# Patient Record
Sex: FEMALE | Race: WHITE | Employment: UNEMPLOYED | URBAN - METROPOLITAN AREA
[De-identification: names, ages, dates, MRNs, and addresses within clinical notes are randomized per-mention and may not be internally consistent; named-entity substitution may affect disease eponyms.]

---

## 2017-02-10 PROBLEM — R20.0 HAND NUMBNESS: Status: ACTIVE | Noted: 2017-02-10

## 2020-06-22 PROBLEM — R53.1 RAPIDLY PROGRESSIVE WEAKNESS: Status: ACTIVE | Noted: 2020-06-22

## 2020-06-22 PROBLEM — R00.0 TACHYCARDIA: Status: ACTIVE | Noted: 2020-06-22

## 2020-06-25 PROBLEM — G61.0 AIDP (ACUTE INFLAMMATORY DEMYELINATING POLYNEUROPATHY) (HCC): Status: ACTIVE | Noted: 2020-06-01

## 2020-07-09 PROBLEM — D64.9 ANEMIA: Status: ACTIVE | Noted: 2020-07-09

## 2020-07-09 PROBLEM — R79.89 ABNORMAL LFTS: Status: ACTIVE | Noted: 2020-07-09

## 2020-07-09 PROBLEM — A41.9 SEPSIS SECONDARY TO UTI (HCC): Status: ACTIVE | Noted: 2020-07-09

## 2020-07-31 PROBLEM — R53.1 RAPIDLY PROGRESSIVE WEAKNESS: Status: RESOLVED | Noted: 2020-06-22 | Resolved: 2020-07-31

## 2020-07-31 PROBLEM — A41.9 SEPSIS SECONDARY TO UTI (HCC): Status: RESOLVED | Noted: 2020-07-09 | Resolved: 2020-07-31

## 2020-07-31 PROBLEM — R00.0 TACHYCARDIA: Status: RESOLVED | Noted: 2020-06-22 | Resolved: 2020-07-31

## 2020-07-31 PROBLEM — D64.9 ANEMIA: Status: RESOLVED | Noted: 2020-07-09 | Resolved: 2020-07-31

## 2020-07-31 PROBLEM — R20.0 HAND NUMBNESS: Status: RESOLVED | Noted: 2017-02-10 | Resolved: 2020-07-31

## 2020-08-07 DIAGNOSIS — G61.0 AIDP (ACUTE INFLAMMATORY DEMYELINATING POLYNEUROPATHY) (HCC): ICD-10-CM

## 2020-08-07 LAB
A/G RATIO: 0.9 (ref 1.1–2.2)
ALBUMIN SERPL-MCNC: 4.1 G/DL (ref 3.4–5)
ALP BLD-CCNC: 90 U/L (ref 40–129)
ALT SERPL-CCNC: 18 U/L (ref 10–40)
ANION GAP SERPL CALCULATED.3IONS-SCNC: 14 MMOL/L (ref 3–16)
AST SERPL-CCNC: 26 U/L (ref 15–37)
BILIRUB SERPL-MCNC: 0.4 MG/DL (ref 0–1)
BUN BLDV-MCNC: 9 MG/DL (ref 7–20)
CALCIUM SERPL-MCNC: 9.3 MG/DL (ref 8.3–10.6)
CHLORIDE BLD-SCNC: 102 MMOL/L (ref 99–110)
CO2: 21 MMOL/L (ref 21–32)
CREAT SERPL-MCNC: 0.8 MG/DL (ref 0.6–1.1)
GFR AFRICAN AMERICAN: >60
GFR NON-AFRICAN AMERICAN: >60
GLOBULIN: 4.5 G/DL
GLUCOSE BLD-MCNC: 110 MG/DL (ref 70–99)
POTASSIUM SERPL-SCNC: 3.7 MMOL/L (ref 3.5–5.1)
SODIUM BLD-SCNC: 137 MMOL/L (ref 136–145)
TOTAL PROTEIN: 8.6 G/DL (ref 6.4–8.2)

## 2021-02-26 DIAGNOSIS — R10.11 RUQ PAIN: ICD-10-CM

## 2021-02-26 LAB
A/G RATIO: 0.9 (ref 1.1–2.2)
ALBUMIN SERPL-MCNC: 4.3 G/DL (ref 3.4–5)
ALP BLD-CCNC: 113 U/L (ref 40–129)
ALT SERPL-CCNC: 13 U/L (ref 10–40)
ANION GAP SERPL CALCULATED.3IONS-SCNC: 14 MMOL/L (ref 3–16)
AST SERPL-CCNC: 23 U/L (ref 15–37)
BASOPHILS ABSOLUTE: 0 K/UL (ref 0–0.2)
BASOPHILS RELATIVE PERCENT: 0 %
BILIRUB SERPL-MCNC: 0.3 MG/DL (ref 0–1)
BUN BLDV-MCNC: 9 MG/DL (ref 7–20)
CALCIUM SERPL-MCNC: 9.4 MG/DL (ref 8.3–10.6)
CHLORIDE BLD-SCNC: 100 MMOL/L (ref 99–110)
CO2: 22 MMOL/L (ref 21–32)
CREAT SERPL-MCNC: 0.6 MG/DL (ref 0.6–1.1)
EOSINOPHILS ABSOLUTE: 0 K/UL (ref 0–0.6)
EOSINOPHILS RELATIVE PERCENT: 0.2 %
GFR AFRICAN AMERICAN: >60
GFR NON-AFRICAN AMERICAN: >60
GLOBULIN: 5 G/DL
GLUCOSE BLD-MCNC: 80 MG/DL (ref 70–99)
HCT VFR BLD CALC: 38 % (ref 36–48)
HEMOGLOBIN: 12.9 G/DL (ref 12–16)
LIPASE: 44 U/L (ref 13–60)
LYMPHOCYTES ABSOLUTE: 1.6 K/UL (ref 1–5.1)
LYMPHOCYTES RELATIVE PERCENT: 27.5 %
MCH RBC QN AUTO: 28.4 PG (ref 26–34)
MCHC RBC AUTO-ENTMCNC: 34 G/DL (ref 31–36)
MCV RBC AUTO: 83.5 FL (ref 80–100)
MONOCYTES ABSOLUTE: 0.3 K/UL (ref 0–1.3)
MONOCYTES RELATIVE PERCENT: 4.4 %
NEUTROPHILS ABSOLUTE: 3.9 K/UL (ref 1.7–7.7)
NEUTROPHILS RELATIVE PERCENT: 67.9 %
PDW BLD-RTO: 13.6 % (ref 12.4–15.4)
PLATELET # BLD: 322 K/UL (ref 135–450)
PMV BLD AUTO: 7.5 FL (ref 5–10.5)
POTASSIUM SERPL-SCNC: 3.6 MMOL/L (ref 3.5–5.1)
RBC # BLD: 4.55 M/UL (ref 4–5.2)
SODIUM BLD-SCNC: 136 MMOL/L (ref 136–145)
TOTAL PROTEIN: 9.3 G/DL (ref 6.4–8.2)
WBC # BLD: 5.7 K/UL (ref 4–11)

## 2021-03-04 PROBLEM — G61.0 GUILLAIN-BARRE (HCC): Status: ACTIVE | Noted: 2021-03-04

## 2021-03-04 PROBLEM — K80.20 SYMPTOMATIC CHOLELITHIASIS: Status: ACTIVE | Noted: 2021-03-04

## 2021-05-20 DIAGNOSIS — R30.0 DYSURIA: ICD-10-CM

## 2021-05-20 LAB
BILIRUBIN URINE: NEGATIVE
BLOOD, URINE: NEGATIVE
CLARITY: CLEAR
COLOR: YELLOW
GLUCOSE URINE: NEGATIVE MG/DL
KETONES, URINE: NEGATIVE MG/DL
LEUKOCYTE ESTERASE, URINE: NEGATIVE
MICROSCOPIC EXAMINATION: NORMAL
NITRITE, URINE: NEGATIVE
PH UA: 6 (ref 5–8)
PROTEIN UA: NEGATIVE MG/DL
SPECIFIC GRAVITY UA: 1.02 (ref 1–1.03)
URINE REFLEX TO CULTURE: NORMAL
URINE TYPE: NORMAL
UROBILINOGEN, URINE: 0.2 E.U./DL

## 2023-03-28 ENCOUNTER — HOSPITAL ENCOUNTER (OUTPATIENT)
Dept: PHYSICAL THERAPY | Age: 38
Setting detail: THERAPIES SERIES
Discharge: HOME | End: 2023-03-28
Payer: OTHER MISCELLANEOUS

## 2023-03-28 PROCEDURE — 97110 THERAPEUTIC EXERCISES: CPT

## 2023-03-28 PROCEDURE — 97530 THERAPEUTIC ACTIVITIES: CPT

## 2023-03-28 PROCEDURE — 97161 PT EVAL LOW COMPLEX 20 MIN: CPT

## 2023-03-28 NOTE — PLAN OF CARE
extension    []Lateral Shift     []visible frontal plane deviation     []directional preference for lateral translation   [] \"Manipulation subgroup\"  (3/4 meets manipulation criteria)     []symptoms less than 16 days    []FABQ less than 19    []Hypomobility of lumbar spine    []IR of at least 1 hip >35 degrees    Other Factors to consider:    []no symptoms distal to the knee    []no red flags and minimal yellow flags    []no contraindications to manipulation   [] \"Traction subgroup\"      []signs and symptoms of nerve root compression (radiculopathy)     []unable to centralize distal symptoms with movement    []pain or numbness in the lumbar spine, buttock, and/or LE    []peripheralization with extension movements    []+ SLR or + crossed SLR (symptoms less than 70 degrees)  []Movement Control Approach   []\"Stabilization subgroup\"    []younger age (less than 40)     []greater general flexibility (post-partum, SLR >90)    []abberant movements, painful arc, or Charles's sign with flex/ext ROM    []positive prone instability test  []Functional Optimization Approach   []\"unspecified subgroup\"    []lower disability scores     []lower fear avoidance scores     []longer duration of symptoms (chronic)    []prior episodes of low back pain    []older age                         [x]Patient history, allergies, meds reviewed. Medical chart reviewed. See intake form. Review Of Systems (ROS):  [x]Performed Review of systems (Integumentary, CardioPulmonary, Neurological) by intake and observation. Intake form has been scanned into medical record. Patient has been instructed to contact their primary care physician regarding ROS issues if not already being addressed at this time.       Co-morbidities/Complexities (which will affect course of rehabilitation):   []None           Arthritic conditions   []Rheumatoid arthritis (M05.9)  []Osteoarthritis (M19.91)   Cardiovascular conditions   [x]Hypertension (I10)  []Hyperlipidemia

## 2023-03-28 NOTE — FLOWSHEET NOTE
[address] [place] [address] [facility], [place]                                                         Physical Therapy Daily Treatment Note  Date:  3/28/2023    Patient Name:  John Moreno    :  1985  MRN: [id number]    Medical/Treatment Diagnosis Information:  Diagnosis: M54.50 (ICD-10-CM) - Acute left-sided low back pain without sciatica  Treatment Diagnosis: M54.50  Low back pain, unspecified  Insurance/Certification information:  PT Insurance Information: Auto Insurance  Physician Information:  Referring Provider (secondary):  Rigoberto mSith MD  Has the plan of care been signed (Y/N):        []  Yes  [x]  No     Date of Patient follow up with Physician: PRN      Is this a Progress Report:     []  Yes  [x]  No        If Yes:  Date Range for reporting period:  Beginning- 3/28/2023   Ending    Progress report will be due (10 Rx or 30 days whichever is less):  3/96       Recertification will be due (POC Duration  / 90 days whichever is less): as above        Visit # Insurance Allowable Auth Required   1  []  Yes []  No        Functional Scale:     OUTCOME MEASURE DATE DEFICIT   JAYDEN 3/28 IE 6% Deficit         Latex Allergy:  [x]NO      []YES  Preferred Language for Healthcare:   [x]English       []other:      Pain level:  2/10  on 3/28/2023    SUBJECTIVE:  See eval    OBJECTIVE:   See eval  Standing Exam Normal Abnormal N/A Comments   Toe walk             Heel Walk           Side bending Range WFL:     - R: L sided low back pain  - L: lumbar spine pain          Pelvic Height           Fwd Bend- (aberrant juttering or innominate mvmt) 50% limitation, tight         Extension WFL, tight          Trendelenburg           Kemps/Quadrant           Stork           Rotation WFL                    Prone Exam Normal Abnormal N/A Comments   Prone knee bend x         Prone hip IR           B Achilles reflex/Pheasant           PA/Spring

## 2023-04-05 ENCOUNTER — HOSPITAL ENCOUNTER (OUTPATIENT)
Dept: PHYSICAL THERAPY | Age: 38
Setting detail: THERAPIES SERIES
Discharge: HOME | End: 2023-04-05
Payer: OTHER MISCELLANEOUS

## 2023-04-05 PROCEDURE — 97112 NEUROMUSCULAR REEDUCATION: CPT

## 2023-04-05 PROCEDURE — 97140 MANUAL THERAPY 1/> REGIONS: CPT

## 2023-04-05 PROCEDURE — 97110 THERAPEUTIC EXERCISES: CPT

## 2023-04-05 NOTE — FLOWSHEET NOTE
[address] [facility], [place]                                                         Physical Therapy Daily Treatment Note  Date:  2023    Patient Name:  Alysa Santos    :  1985  MRN: [id number]    Medical/Treatment Diagnosis Information:  Diagnosis: M54.50 (ICD-10-CM) - Acute left-sided low back pain without sciatica  Treatment Diagnosis: M54.50  Low back pain, unspecified  Insurance/Certification information:  PT Insurance Information: Auto Insurance  Physician Information:  Referring Provider (secondary): Giorgi Rubio MD  Has the plan of care been signed (Y/N):        [x]  Yes  []  No     Date of Patient follow up with Physician: PRN      Is this a Progress Report:     []  Yes  [x]  No        If Yes:  Date Range for reporting period:  Beginning- 3/28/2023   Ending    Progress report will be due (10 Rx or 30 days whichever is less):  3/29       Recertification will be due (POC Duration  / 90 days whichever is less): as above        Visit # Insurance Allowable Auth Required   2  []  Yes []  No        Functional Scale:     OUTCOME MEASURE DATE DEFICIT   JAYDEN 3/28 IE 6% Deficit         Latex Allergy:  [x]NO      []YES  Preferred Language for Healthcare:   [x]English       []other:      Pain level:  0/10  on 2023    SUBJECTIVE:   Patient states she was active cutting the grass and picking up sticks, which causes her increased pain.        OBJECTIVE:   See eval  Standing Exam Normal Abnormal N/A Comments   Toe walk             Heel Walk           Side bending Range WFL:     - R: L sided low back pain  - L: lumbar spine pain          Pelvic Height           Fwd Bend- (aberrant juttering or innominate mvmt) 50% limitation, tight         Extension WFL, tight          Trendelenburg           Kemps/Quadrant           Stork           Rotation [facility]                    Prone Exam Normal Abnormal N/A Comments

## 2023-04-18 ENCOUNTER — HOSPITAL ENCOUNTER (OUTPATIENT)
Dept: PHYSICAL THERAPY | Age: 38
Setting detail: THERAPIES SERIES
Discharge: HOME | End: 2023-04-18
Payer: OTHER MISCELLANEOUS

## 2023-04-18 PROCEDURE — 97530 THERAPEUTIC ACTIVITIES: CPT

## 2023-04-18 PROCEDURE — 97112 NEUROMUSCULAR REEDUCATION: CPT

## 2023-04-18 PROCEDURE — 97110 THERAPEUTIC EXERCISES: CPT

## 2023-04-18 NOTE — FLOWSHEET NOTE
proprioception and motor activation to allow for proper function  with self care and ADLs  [] (30277) Provided training and instruction to the patient for proper core and proximal hip recruitment and positioning with ambulation re-education     Home Exercise Program:    [x] (23608) Reviewed/Progressed HEP activities related to strengthening, flexibility, endurance, ROM of core, proximal hip and LE for functional self-care, mobility, lifting and ambulation   [] (09191) Reviewed/Progressed HEP activities related to improving balance, coordination, kinesthetic sense, posture, motor skill, proprioception of core, proximal hip and LE for self care, mobility, lifting, and ambulation      Manual Treatments:  PROM / STM / Oscillations-Mobs:  G-I, II, III, IV (PA's, Inf., Post.)  [x] (37762) Provided manual therapy to mobilize proximal hip and LS spine soft tissue/joints for the purpose of modulating pain, promoting relaxation,  increasing ROM, reducing/eliminating soft tissue swelling/inflammation/restriction, improving soft tissue extensibility and allowing for proper ROM for normal function with self care, mobility, lifting and ambulation. Modalities:       Charges:  Timed Code Treatment Minutes: 45'   Total Treatment Minutes: 36'       [] EVAL (LOW) 455 1011   [] EVAL (MOD) 99574   [] EVAL (HIGH) 90718   [] RE-EVAL   [x] LT(87503) x  1   [] IONTO  [x] NMR (67017) x  1   [] VASO  [] Manual (41227) x      [] Other:  [x] TA x   1   [] Mech Traction (63710)  [] ES(attended) (04804)      [] ES (un) (51937):     HEP instruction:   Access Code: [email or web address]  URL: [email or web address]  Date: 04/12/2023  Prepared by: Mely Henderson  (See scanned forms)    GOALS:  Patient stated goal: return to [facility]    Therapist goals for Patient:     Short Term Goals: To be achieved in: 2 weeks  1. Independent in HEP and progression per patient tolerance, in order to prevent re-injury. [] Progressing: [] Met: [] Not Met: [] Adjusted   2.

## 2023-04-20 ENCOUNTER — HOSPITAL ENCOUNTER (OUTPATIENT)
Dept: PHYSICAL THERAPY | Age: 38
Setting detail: THERAPIES SERIES
Discharge: HOME | End: 2023-04-20
Payer: OTHER MISCELLANEOUS

## 2023-04-20 PROCEDURE — 97530 THERAPEUTIC ACTIVITIES: CPT

## 2023-04-20 NOTE — FLOWSHEET NOTE
and ambulation      Manual Treatments:  PROM / STM / Oscillations-Mobs:  G-I, II, III, IV (PA's, Inf., Post.)  [x] (56756) Provided manual therapy to mobilize proximal hip and LS spine soft tissue/joints for the purpose of modulating pain, promoting relaxation,  increasing ROM, reducing/eliminating soft tissue swelling/inflammation/restriction, improving soft tissue extensibility and allowing for proper ROM for normal function with self care, mobility, lifting and ambulation. Modalities:       Charges:  Timed Code Treatment Minutes: 15'   Total Treatment Minutes: 17'        [] EVAL (LOW) 17593   [] EVAL (MOD) 99947   [] EVAL (HIGH) 68057   [] RE-EVAL   [] ER(71276) x     [] IONTO  [] NMR (57356) x     [] VASO  [] Manual (67338) x      [] Other:  [x] TA x   1   [] Mech Traction (07553)  [] ES(attended) (25379)      [] ES (un) (97459):     HEP instruction:   Access Code: [email or web address]  URL: [email or web address]  Date: 04/20/2023  Prepared by: Ioana Diaz  (See scanned forms)    GOALS:  Patient stated goal: return to [facility]    Therapist goals for Patient:     Short Term Goals: To be achieved in: 2 weeks  1. Independent in HEP and progression per patient tolerance, in order to prevent re-injury. [] Progressing: [x] Met: [] Not Met: [] Adjusted   2. Patient will have a decrease in pain to facilitate improvement in movement, function, and ADLs as indicated by Functional Deficits. [] Progressing: [x] Met: [] Not Met: [] Adjusted    Long Term Goals: To be achieved in: 6 weeks  1. Disability index score of 3% or less for the JAYDEN to assist with reaching prior level of function. [] Progressing: [x] Met: [] Not Met: [] Adjusted  2. Patient will demonstrate increased lumbar AROM to [facility] and pain free to allow for proper joint functioning as indicated by patients Functional Deficits. [] Progressing: [x] Met: [] Not Met: [] Adjusted  3.  Patient will be able to bend forwards to tie her shoes without increased

## 2023-06-08 ENCOUNTER — HOSPITAL ENCOUNTER (EMERGENCY)
Age: 38
Discharge: HOME | End: 2023-06-08
Payer: OTHER MISCELLANEOUS

## 2023-06-08 ENCOUNTER — APPOINTMENT (OUTPATIENT)
Dept: CT IMAGING | Age: 38
End: 2023-06-08
Payer: OTHER MISCELLANEOUS

## 2023-06-08 VITALS
BODY MASS INDEX: 31.12 KG/M2 | RESPIRATION RATE: 20 BRPM | WEIGHT: 154.1 LBS | SYSTOLIC BLOOD PRESSURE: 134 MMHG | DIASTOLIC BLOOD PRESSURE: 88 MMHG | OXYGEN SATURATION: 100 % | TEMPERATURE: 98 F | HEART RATE: 97 BPM

## 2023-06-08 DIAGNOSIS — R31.9 URINARY TRACT INFECTION WITH HEMATURIA, SITE UNSPECIFIED: Primary | ICD-10-CM

## 2023-06-08 DIAGNOSIS — N39.0 URINARY TRACT INFECTION WITH HEMATURIA, SITE UNSPECIFIED: Primary | ICD-10-CM

## 2023-06-08 DIAGNOSIS — R10.9 ABDOMINAL PAIN, UNSPECIFIED ABDOMINAL LOCATION: ICD-10-CM

## 2023-06-08 LAB
ALBUMIN SERPL-MCNC: 4.2 G/DL (ref 3.4–5)
ALBUMIN/GLOB SERPL: 1.1 {RATIO} (ref 1.1–2.2)
ALP SERPL-CCNC: 104 U/L (ref 40–129)
ALT SERPL-CCNC: 20 U/L (ref 10–40)
ANION GAP SERPL CALCULATED.3IONS-SCNC: 11 MMOL/L (ref 3–16)
AST SERPL-CCNC: 30 U/L (ref 15–37)
BACTERIA URNS QL MICRO: ABNORMAL /HPF
BASOPHILS # BLD: 0 K/UL (ref 0–0.2)
BASOPHILS NFR BLD: 0.1 %
BILIRUB SERPL-MCNC: <0.2 MG/DL (ref 0–1)
BILIRUB UR QL STRIP.AUTO: NEGATIVE
BUN SERPL-MCNC: 8 MG/DL (ref 7–20)
CALCIUM SERPL-MCNC: 9.2 MG/DL (ref 8.3–10.6)
CHLORIDE SERPL-SCNC: 104 MMOL/L (ref 99–110)
CLARITY UR: ABNORMAL
CO2 SERPL-SCNC: 24 MMOL/L (ref 21–32)
COLOR UR: YELLOW
CREAT SERPL-MCNC: 0.9 MG/DL (ref 0.6–1.1)
DEPRECATED RDW RBC AUTO: 14 % (ref 12.4–15.4)
EOSINOPHIL # BLD: 0 K/UL (ref 0–0.6)
EOSINOPHIL NFR BLD: 0.3 %
EPI CELLS #/AREA URNS AUTO: 39 /HPF (ref 0–5)
GFR SERPLBLD CREATININE-BSD FMLA CKD-EPI: >60 ML/MIN/{1.73_M2}
GLUCOSE SERPL-MCNC: 101 MG/DL (ref 70–99)
GLUCOSE UR STRIP.AUTO-MCNC: NEGATIVE MG/DL
HCG UR QL: NEGATIVE
HCT VFR BLD AUTO: 35.1 % (ref 36–48)
HGB BLD-MCNC: 12.1 G/DL (ref 12–16)
HGB UR QL STRIP.AUTO: ABNORMAL
HYALINE CASTS #/AREA URNS AUTO: 2 /LPF (ref 0–8)
KETONES UR STRIP.AUTO-MCNC: NEGATIVE MG/DL
LEUKOCYTE ESTERASE UR QL STRIP.AUTO: ABNORMAL
LIPASE SERPL-CCNC: 27 U/L (ref 13–60)
LYMPHOCYTES # BLD: 1.5 K/UL (ref 1–5.1)
LYMPHOCYTES NFR BLD: 22.1 %
MCH RBC QN AUTO: 29.6 PG (ref 26–34)
MCHC RBC AUTO-ENTMCNC: 34.5 G/DL (ref 31–36)
MCV RBC AUTO: 85.7 FL (ref 80–100)
MONOCYTES # BLD: 0.5 K/UL (ref 0–1.3)
MONOCYTES NFR BLD: 7.7 %
NEUTROPHILS # BLD: 4.7 K/UL (ref 1.7–7.7)
NEUTROPHILS NFR BLD: 69.8 %
NITRITE UR QL STRIP.AUTO: NEGATIVE
PH UR STRIP.AUTO: 5 [PH] (ref 5–8)
PLATELET # BLD AUTO: 295 K/UL (ref 135–450)
PMV BLD AUTO: 7.1 FL (ref 5–10.5)
POTASSIUM SERPL-SCNC: 4.1 MMOL/L (ref 3.5–5.1)
PROT SERPL-MCNC: 8.1 G/DL (ref 6.4–8.2)
PROT UR STRIP.AUTO-MCNC: ABNORMAL MG/DL
RBC # BLD AUTO: 4.09 M/UL (ref 4–5.2)
SODIUM SERPL-SCNC: 139 MMOL/L (ref 136–145)
SP GR UR STRIP.AUTO: 1.02 (ref 1–1.03)
UA COMPLETE W REFLEX CULTURE PNL UR: YES
UA DIPSTICK W REFLEX MICRO PNL UR: YES
URN SPEC COLLECT METH UR: ABNORMAL
UROBILINOGEN UR STRIP-ACNC: 0.2 E.U./DL
WBC # BLD AUTO: 6.7 K/UL (ref 4–11)
WBC #/AREA URNS AUTO: 37 /HPF (ref 0–5)

## 2023-06-08 PROCEDURE — 87086 URINE CULTURE/COLONY COUNT: CPT

## 2023-06-08 PROCEDURE — 80053 COMPREHEN METABOLIC PANEL: CPT

## 2023-06-08 PROCEDURE — 6370000000 HC RX 637 (ALT 250 FOR IP): Performed by: PHYSICIAN ASSISTANT

## 2023-06-08 PROCEDURE — 83690 ASSAY OF LIPASE: CPT

## 2023-06-08 PROCEDURE — 74177 CT ABD & PELVIS W/CONTRAST: CPT

## 2023-06-08 PROCEDURE — 84703 CHORIONIC GONADOTROPIN ASSAY: CPT

## 2023-06-08 PROCEDURE — 85025 COMPLETE CBC W/AUTO DIFF WBC: CPT

## 2023-06-08 PROCEDURE — 36415 COLL VENOUS BLD VENIPUNCTURE: CPT

## 2023-06-08 PROCEDURE — 6360000004 HC RX CONTRAST MEDICATION: Performed by: PHYSICIAN ASSISTANT

## 2023-06-08 PROCEDURE — 81001 URINALYSIS AUTO W/SCOPE: CPT

## 2023-06-08 RX ORDER — DICYCLOMINE HYDROCHLORIDE 10 MG/1
20 CAPSULE ORAL ONCE
Status: COMPLETED | OUTPATIENT
Start: 2023-06-08 | End: 2023-06-08

## 2023-06-08 RX ORDER — CEFUROXIME AXETIL 250 MG/1
250 TABLET ORAL 2 TIMES DAILY
Qty: 10 TABLET | Refills: 0 | Status: SHIPPED | OUTPATIENT
Start: 2023-06-08 | End: 2023-06-13

## 2023-06-08 RX ADMIN — DICYCLOMINE HYDROCHLORIDE 20 MG: 10 CAPSULE ORAL at 20:10

## 2023-06-08 RX ADMIN — IOPAMIDOL 75 ML: 755 INJECTION, SOLUTION INTRAVENOUS at 20:32

## 2023-06-08 ASSESSMENT — PAIN - FUNCTIONAL ASSESSMENT
PAIN_FUNCTIONAL_ASSESSMENT: 0-10
PAIN_FUNCTIONAL_ASSESSMENT: NONE - DENIES PAIN

## 2023-06-08 ASSESSMENT — PAIN SCALES - GENERAL
PAINLEVEL_OUTOF10: 2
PAINLEVEL_OUTOF10: 3

## 2023-06-08 ASSESSMENT — ENCOUNTER SYMPTOMS
DIARRHEA: 1
COUGH: 0
EYE PAIN: 0
BACK PAIN: 0
SORE THROAT: 0
VOMITING: 0
NAUSEA: 0
ABDOMINAL PAIN: 1
SHORTNESS OF BREATH: 0

## 2023-06-08 ASSESSMENT — PAIN DESCRIPTION - LOCATION: LOCATION: ABDOMEN

## 2023-06-09 NOTE — DISCHARGE INSTRUCTIONS
Take prescribed medication as prescribed  Follow-up with primary care provider in 1 week to make sure urine clears up  Take OTC Tylenol Motrin as needed for pain  Return emergency room for any worsening

## 2023-06-09 NOTE — ED NOTES
Discharge and education instructions reviewed. Patient verbalized understanding, teach-back successful. Patient denied questions at this time. No acute distress noted. Patient instructed to follow-up as noted - return to emergency department if symptoms worsen. Patient verbalized understanding. Discharged per EDMD with discharge instructions.         Latoya Joaquin RN  06/08/23 4215

## 2023-06-09 NOTE — ED PROVIDER NOTES
**ADVANCED PRACTICE PROVIDER, I HAVE EVALUATED THIS PATIENT**        [address]      Pt Name: Augustine Welsh  JSQ:[id number]  [place] 1985  Date of evaluation: 6/8/2023  Provider: Anne Luo PA-C  Note Started: 8:24 PM EDT 6/8/2023        Chief Complaint:    Chief Complaint   Patient presents with    Abdominal Pain     Since Tuesday, thought maybe was related to [place]. Seen by PCP today and told she may have an infection. Nursing Notes, Past Medical Hx, Past Surgical Hx, Social Hx, Allergies, and Family Hx were all reviewed and agreed with or any disagreements were addressed in the HPI.    HPI: (Location, Duration, Timing, Severity, Quality, Assoc Sx, Context, Modifying factors)    History From: Patient  Limitations to history : None    Social Determinants Significantly Affecting Health : None    Chief Complaint of mid abdominal pain. Patient states he is active doing Sunday and states on skyline there and she started having some abdominal pain with diarrhea or Tuesday night Wednesday morning. Her last bowel movement was yesterday with diarrhea. She is complained of some mid abdominal pain today. Denies fever, pain does radiate to her back. Denies chest pain, no extremity pain or weakness. Denies blood in her stool. No weakness. Denies fever. No other complaints.     This is a  43 y.o. female who presents to emergency room with above complaint    PastMedical/Surgical History:      Diagnosis Date    AIDP (acute inflammatory demyelinating polyneuropathy) ([facility] [address] 06/2020    Admit to [facility]    Anesthesia complication     after colonoscopy-was lightheaded    Anxiety     COVID     Depression     Guillain Barré syndrome (HCC)     Heartburn     HTN (hypertension)          Procedure Laterality Date    CHOLECYSTECTOMY, LAPAROSCOPIC N/A 03/15/2021    [facility]    COLONOSCOPY  12/01/2017    [facility]- Normal    [facility]IA

## 2023-06-10 LAB — BACTERIA UR CULT: NORMAL

## 2023-07-13 ENCOUNTER — HOSPITAL ENCOUNTER (OUTPATIENT)
Dept: WOMENS IMAGING | Age: 38
Discharge: HOME | End: 2023-07-13
Payer: MEDICARE

## 2023-07-13 DIAGNOSIS — Z12.31 BREAST CANCER SCREENING BY MAMMOGRAM: ICD-10-CM

## 2023-07-13 PROCEDURE — 77067 SCR MAMMO BI INCL CAD: CPT

## 2023-07-31 ENCOUNTER — HOSPITAL ENCOUNTER (OUTPATIENT)
Dept: WOMENS IMAGING | Age: 38
Discharge: HOME | End: 2023-07-31
Payer: MEDICARE

## 2023-07-31 DIAGNOSIS — R92.8 ABNORMAL MAMMOGRAM: ICD-10-CM

## 2023-07-31 PROCEDURE — G0279 TOMOSYNTHESIS, MAMMO: HCPCS

## 2023-10-21 ENCOUNTER — OFFICE VISIT (OUTPATIENT)
Age: 38
End: 2023-10-21

## 2023-10-21 VITALS
BODY MASS INDEX: 30.42 KG/M2 | OXYGEN SATURATION: 98 % | DIASTOLIC BLOOD PRESSURE: 84 MMHG | SYSTOLIC BLOOD PRESSURE: 116 MMHG | HEART RATE: 87 BPM | TEMPERATURE: 98.9 F | RESPIRATION RATE: 18 BRPM | WEIGHT: 150.6 LBS

## 2023-10-21 DIAGNOSIS — J06.9 VIRAL URI: Primary | ICD-10-CM

## 2023-10-21 PROBLEM — R53.1 WEAKNESS: Status: ACTIVE | Noted: 2022-11-29

## 2023-10-21 PROBLEM — M22.41 CHONDROMALACIA OF PATELLA, RIGHT: Status: ACTIVE | Noted: 2019-12-10

## 2023-10-21 PROBLEM — E53.8 LOW SERUM VITAMIN B12: Status: ACTIVE | Noted: 2022-11-30

## 2023-10-21 LAB
Lab: NORMAL
QC PASS/FAIL: NORMAL
SARS-COV-2 RDRP RESP QL NAA+PROBE: NEGATIVE

## 2023-10-21 ASSESSMENT — ENCOUNTER SYMPTOMS
CHEST TIGHTNESS: 0
SHORTNESS OF BREATH: 0
VOMITING: 0
VOICE CHANGE: 0
DIARRHEA: 0
SORE THROAT: 1
NAUSEA: 0
ABDOMINAL PAIN: 0
CONSTIPATION: 0
TROUBLE SWALLOWING: 0
COUGH: 1
SINUS PRESSURE: 1

## 2023-10-21 NOTE — PATIENT INSTRUCTIONS
Please follow up with your family doctor as needed  Please go to develop shortness of breath, chest pain, or fevers you cannot control

## 2024-02-22 ENCOUNTER — APPOINTMENT (OUTPATIENT)
Dept: ULTRASOUND IMAGING | Age: 39
End: 2024-02-22
Attending: INTERNAL MEDICINE
Payer: MEDICARE

## 2024-02-22 ENCOUNTER — HOSPITAL ENCOUNTER (OUTPATIENT)
Dept: WOMENS IMAGING | Age: 39
Discharge: HOME | End: 2024-02-22
Attending: INTERNAL MEDICINE
Payer: MEDICARE

## 2024-02-22 DIAGNOSIS — R92.8 ABNORMAL MAMMOGRAM OF RIGHT BREAST: ICD-10-CM

## 2024-02-22 PROCEDURE — G0279 TOMOSYNTHESIS, MAMMO: HCPCS

## 2024-07-01 ENCOUNTER — HOSPITAL ENCOUNTER (EMERGENCY)
Age: 39
Discharge: HOME | End: 2024-07-01
Attending: EMERGENCY MEDICINE
Payer: MEDICARE

## 2024-07-01 VITALS
DIASTOLIC BLOOD PRESSURE: 65 MMHG | TEMPERATURE: 98.2 F | SYSTOLIC BLOOD PRESSURE: 125 MMHG | HEART RATE: 81 BPM | RESPIRATION RATE: 24 BRPM | OXYGEN SATURATION: 100 % | BODY MASS INDEX: 34.22 KG/M2 | HEIGHT: 59 IN | WEIGHT: 169.75 LBS

## 2024-07-01 DIAGNOSIS — R11.2 NAUSEA AND VOMITING, UNSPECIFIED VOMITING TYPE: ICD-10-CM

## 2024-07-01 DIAGNOSIS — T50.905A ADVERSE EFFECT OF DRUG, INITIAL ENCOUNTER: Primary | ICD-10-CM

## 2024-07-01 LAB
GLUCOSE BLD-MCNC: 92 MG/DL (ref 70–99)
PERFORMED ON: NORMAL

## 2024-07-01 PROCEDURE — 99283 EMERGENCY DEPT VISIT LOW MDM: CPT

## 2024-07-01 PROCEDURE — 93005 ELECTROCARDIOGRAM TRACING: CPT | Performed by: EMERGENCY MEDICINE

## 2024-07-01 RX ORDER — ONDANSETRON 4 MG/1
4 TABLET, ORALLY DISINTEGRATING ORAL EVERY 8 HOURS PRN
Qty: 7 TABLET | Refills: 0 | Status: SHIPPED | OUTPATIENT
Start: 2024-07-01

## 2024-07-01 ASSESSMENT — LIFESTYLE VARIABLES
HOW MANY STANDARD DRINKS CONTAINING ALCOHOL DO YOU HAVE ON A TYPICAL DAY: 1 OR 2
HOW OFTEN DO YOU HAVE A DRINK CONTAINING ALCOHOL: MONTHLY OR LESS

## 2024-07-01 ASSESSMENT — PAIN - FUNCTIONAL ASSESSMENT: PAIN_FUNCTIONAL_ASSESSMENT: NONE - DENIES PAIN

## 2024-07-02 LAB
EKG ATRIAL RATE: 89 BPM
EKG DIAGNOSIS: NORMAL
EKG P AXIS: 52 DEGREES
EKG P-R INTERVAL: 138 MS
EKG Q-T INTERVAL: 384 MS
EKG QRS DURATION: 80 MS
EKG QTC CALCULATION (BAZETT): 467 MS
EKG R AXIS: 30 DEGREES
EKG T AXIS: 33 DEGREES
EKG VENTRICULAR RATE: 89 BPM

## 2024-07-02 PROCEDURE — 93010 ELECTROCARDIOGRAM REPORT: CPT | Performed by: INTERNAL MEDICINE

## 2024-07-02 ASSESSMENT — ENCOUNTER SYMPTOMS
SHORTNESS OF BREATH: 0
NAUSEA: 1
VOMITING: 1
BACK PAIN: 0
ABDOMINAL PAIN: 0

## 2024-07-02 NOTE — ED PROVIDER NOTES
[facility] EMERGENCY DEPARTMENT    Name: Nkechi Shukla : 1985 MRN: [id number] Date of Service: 2024    Initial VS: BP: (!) 146/97, Temp: 98.2 °F (36.8 °C), Pulse: 100, Respirations: 20, SpO2: 100 %     CC: fatigue, vomiting    HPI: this patient is a 39 y.o. female presenting to the ED from home. Ms. Shukla tells me that she has never used any marijuana edibles before. Late this afternoon she wanted to try one so she ingested the entirety of a marijuana gummy provided to her by a family member. About an hour later she started to feel incredibly tired and \"a little weird.\" Soon thereafter she became nauseous and started vomiting profusely. She spoke with this family member, who then informed Ms. Shukla that this gummy contained a high dose of THC and she was supposed to only consume 1/8th to 1/4th of it at a time. Ms. Shukla was concerned by these symptoms so she called 911 for assistance and she was brought here to be evaluated. On arrival here, however, she states that she is feeling much less fatigued, she is \"not feeling weird anymore,\" and her nausea seems to have subsided over time. She notes that she is actually feeling \"pretty good now.\" She has not appreciated other changes from her usual state of health and she denies other current complaints.  _____________________________________________________________________    Review of Systems   Constitutional:  Positive for fatigue. Negative for chills and fever.   Eyes:  Negative for visual disturbance.   Respiratory:  Negative for shortness of breath.    Cardiovascular:  Negative for chest pain.   Gastrointestinal:  Positive for nausea and vomiting. Negative for abdominal pain.   Genitourinary:  Negative for decreased urine volume and flank pain.   Musculoskeletal:  Negative for back pain and gait problem.   Skin:  Negative for rash.   Neurological:  Negative for syncope, weakness, numbness and headaches.       Physical

## 2024-07-02 NOTE — DISCHARGE INSTRUCTIONS
Your prescription has been sent to [facility].    Be sure to contact your primary care provider to arrange for a follow up visit.    If you have any new or worsening issues after going home don't hesitate to return here for reevaluation at any time 24/7!

## 2024-07-24 ENCOUNTER — OFFICE VISIT (OUTPATIENT)
Dept: ORTHOPEDIC SURGERY | Age: 39
End: 2024-07-24

## 2024-07-24 ENCOUNTER — TELEPHONE (OUTPATIENT)
Dept: ORTHOPEDIC SURGERY | Age: 39
End: 2024-07-24

## 2024-07-24 VITALS — BODY MASS INDEX: 34.07 KG/M2 | HEIGHT: 59 IN | WEIGHT: 169 LBS

## 2024-07-24 DIAGNOSIS — M25.562 ACUTE PAIN OF LEFT KNEE: Primary | ICD-10-CM

## 2024-07-24 DIAGNOSIS — M70.52 PES ANSERINUS BURSITIS OF LEFT KNEE: ICD-10-CM

## 2024-07-24 DIAGNOSIS — M17.12 ARTHRITIS OF LEFT KNEE: ICD-10-CM

## 2024-07-24 RX ORDER — METHYLPREDNISOLONE ACETATE 40 MG/ML
80 INJECTION, SUSPENSION INTRA-ARTICULAR; INTRALESIONAL; INTRAMUSCULAR; SOFT TISSUE ONCE
Status: COMPLETED | OUTPATIENT
Start: 2024-07-24 | End: 2024-07-24

## 2024-07-24 RX ADMIN — METHYLPREDNISOLONE ACETATE 80 MG: 40 INJECTION, SUSPENSION INTRA-ARTICULAR; INTRALESIONAL; INTRAMUSCULAR; SOFT TISSUE at 13:33

## 2024-07-24 RX ADMIN — Medication 4 ML: at 13:32

## 2024-07-24 NOTE — PROGRESS NOTES
Nkechi Shukla is seen today at the request of Dr. Jose Noonan for evaluation and treatment of chronic left knee pain.  He saw her for about 2 months of left knee pain last week.  She was given a Medrol Dosepak.  She says that has not been very helpful  Today she reports severe pain in the medial knee that is 2 out of 10 at rest but 9 out of 10 at night.  Ibuprofen is somewhat helpful.            Past medical history significant for anxiety and high blood pressure.  She also has chronic inflammatory demyelinating polyneuropathy and is treated with infusions with neurology.    She is disabled from her neuropathy.    History: Patient's relevant past family, medical, and social history are reviewed as part of today's visit. ROS of pertinent positives and negatives as above; otherwise negative.    General Exam:    Vitals: Height 1.499 m (4' 11\"), weight 76.7 kg (169 lb), last menstrual period 06/03/2024, not currently breastfeeding.  Constitutional: Patient is adequately groomed with no evidence of malnutrition.  She appears markedly older than her stated age.  Mental Status: The patient is oriented to time, place and person.  The patient's mood and affect are appropriate.  Gait:  Patient walks with normal gait and station.  Lymphatic: The lymphatic examination bilaterally reveals all areas to be without enlargement or induration.  Vascular: Examination reveals no swelling or calf tenderness.  Peripheral pulses are palpable and 2+.  Neurological: The patient has good coordination.  There is no weakness or sensory deficit.    Skin:    Head/Neck: inspection reveals no rashes, ulcerations or lesions.  Trunk:  inspection reveals no rashes, ulcerations or lesions.  Right Lower Extremity: inspection reveals no rashes, ulcerations or lesions.  Left Lower Extremity: inspection reveals no rashes, ulcerations or lesions.    Examination of the bilateral hips reveals normal flexion and extension.  There is no restriction in

## 2024-08-10 NOTE — PROGRESS NOTES
Nkechi Shukla returns today to follow-up her left knee.  We did a cortisone injection for arthritis about 3 weeks ago.  She returns today to determine if we need to consider a pes bursa injection.      She said the intra-articular injection was tremendously helpful.  She still has a little bit of pain toward the proximal tibia.    General Exam:    Vitals: Height 1.499 m (4' 11\"), weight 76.7 kg (169 lb), not currently breastfeeding.  Constitutional: Patient is adequately groomed with no evidence of malnutrition  Mental Status: The patient is oriented to time, place and person.  The patient's mood and affect are appropriate.    She walks with a normal gait today.  Left knee has no effusion.  She has no joint line pain medially or laterally but still moderate discomfort over the pes bursa.    Assessment: Improving symptoms after intra-articular injection for left knee arthritis but ongoing discomfort related to her pes bursa.    Plan: We discussed the risk, benefits, and alternatives to the intervention and today were going to proceed with a pes bursa injection.    Procedure: After obtaining verbal consent under sterile technique left knee was injected the point of maximum tenderness at the pes bursa with 80 mg of Depo-Medrol and 10 mg of lidocaine.  She tolerated that well.    She understands we can repeat injections at intervals not sooner than every 3 months.  Follow-up with me on an as-needed basis.

## 2024-08-12 ENCOUNTER — TELEPHONE (OUTPATIENT)
Dept: ORTHOPEDIC SURGERY | Age: 39
End: 2024-08-12

## 2024-08-12 ENCOUNTER — OFFICE VISIT (OUTPATIENT)
Dept: ORTHOPEDIC SURGERY | Age: 39
End: 2024-08-12
Payer: MEDICARE

## 2024-08-12 VITALS — WEIGHT: 169 LBS | HEIGHT: 59 IN | BODY MASS INDEX: 34.07 KG/M2

## 2024-08-12 DIAGNOSIS — M25.562 ACUTE PAIN OF LEFT KNEE: ICD-10-CM

## 2024-08-12 DIAGNOSIS — M70.52 PES ANSERINUS BURSITIS OF LEFT KNEE: Primary | ICD-10-CM

## 2024-08-12 DIAGNOSIS — M17.12 ARTHRITIS OF LEFT KNEE: ICD-10-CM

## 2024-08-12 PROCEDURE — G8427 DOCREV CUR MEDS BY ELIG CLIN: HCPCS | Performed by: ORTHOPAEDIC SURGERY

## 2024-08-12 PROCEDURE — 20610 DRAIN/INJ JOINT/BURSA W/O US: CPT | Performed by: ORTHOPAEDIC SURGERY

## 2024-08-12 PROCEDURE — 1036F TOBACCO NON-USER: CPT | Performed by: ORTHOPAEDIC SURGERY

## 2024-08-12 PROCEDURE — 99212 OFFICE O/P EST SF 10 MIN: CPT | Performed by: ORTHOPAEDIC SURGERY

## 2024-08-12 PROCEDURE — G8417 CALC BMI ABV UP PARAM F/U: HCPCS | Performed by: ORTHOPAEDIC SURGERY

## 2024-08-12 RX ORDER — METHYLPREDNISOLONE ACETATE 40 MG/ML
80 INJECTION, SUSPENSION INTRA-ARTICULAR; INTRALESIONAL; INTRAMUSCULAR; SOFT TISSUE ONCE
Status: COMPLETED | OUTPATIENT
Start: 2024-08-12 | End: 2024-08-12

## 2024-08-12 RX ADMIN — METHYLPREDNISOLONE ACETATE 80 MG: 40 INJECTION, SUSPENSION INTRA-ARTICULAR; INTRALESIONAL; INTRAMUSCULAR; SOFT TISSUE at 08:53

## 2024-08-12 RX ADMIN — Medication 1 ML: at 08:53

## 2024-08-12 NOTE — TELEPHONE ENCOUNTER
Called and spoke with patient. She complains of left knee pain from her cortisone injection. Explained to patient that she may have pain for a few days. She can ice 15-20 minutes every hour as needed. She can take Tylenol and Ibuprofen as needed. She should give it 7-10 days to see if it works. Patient verbalized understanding.

## 2024-08-12 NOTE — TELEPHONE ENCOUNTER
General Question     Subject: INJ CONCERN  Patient and /or Facility Request: Nkechi Shukla   Contact Number: [phone]     PT REQ CLL BK FROM OFFICE ABOUT HER INJ FROM TODAY    PLEASE CLL PT TO ADV

## 2024-08-29 ENCOUNTER — HOSPITAL ENCOUNTER (OUTPATIENT)
Dept: WOMENS IMAGING | Age: 39
Discharge: HOME | End: 2024-08-29
Payer: MEDICARE

## 2024-08-29 ENCOUNTER — APPOINTMENT (OUTPATIENT)
Dept: ULTRASOUND IMAGING | Age: 39
End: 2024-08-29
Payer: MEDICARE

## 2024-08-29 VITALS — HEIGHT: 60 IN | WEIGHT: 167 LBS | BODY MASS INDEX: 32.79 KG/M2

## 2024-08-29 DIAGNOSIS — R92.8 ABNORMAL MAMMOGRAM: ICD-10-CM

## 2024-08-29 PROCEDURE — G0279 TOMOSYNTHESIS, MAMMO: HCPCS

## 2024-10-24 ENCOUNTER — OFFICE VISIT (OUTPATIENT)
Dept: ORTHOPEDIC SURGERY | Age: 39
End: 2024-10-24
Payer: MEDICARE

## 2024-10-24 VITALS — HEIGHT: 60 IN | BODY MASS INDEX: 33.18 KG/M2 | WEIGHT: 169 LBS

## 2024-10-24 DIAGNOSIS — G89.29 CHRONIC RIGHT SHOULDER PAIN: ICD-10-CM

## 2024-10-24 DIAGNOSIS — M25.511 CHRONIC RIGHT SHOULDER PAIN: ICD-10-CM

## 2024-10-24 DIAGNOSIS — M17.12 ARTHRITIS OF LEFT KNEE: Primary | ICD-10-CM

## 2024-10-24 DIAGNOSIS — M25.562 ACUTE PAIN OF LEFT KNEE: ICD-10-CM

## 2024-10-24 PROCEDURE — G8484 FLU IMMUNIZE NO ADMIN: HCPCS | Performed by: ORTHOPAEDIC SURGERY

## 2024-10-24 PROCEDURE — G8427 DOCREV CUR MEDS BY ELIG CLIN: HCPCS | Performed by: ORTHOPAEDIC SURGERY

## 2024-10-24 PROCEDURE — 99214 OFFICE O/P EST MOD 30 MIN: CPT | Performed by: ORTHOPAEDIC SURGERY

## 2024-10-24 PROCEDURE — 1036F TOBACCO NON-USER: CPT | Performed by: ORTHOPAEDIC SURGERY

## 2024-10-24 PROCEDURE — G8417 CALC BMI ABV UP PARAM F/U: HCPCS | Performed by: ORTHOPAEDIC SURGERY

## 2024-10-24 RX ORDER — MELOXICAM 15 MG/1
15 TABLET ORAL DAILY
Qty: 30 TABLET | Refills: 3 | Status: SHIPPED | OUTPATIENT
Start: 2024-10-24

## 2024-10-24 NOTE — PROGRESS NOTES
Nkechi Shukla returns today to follow-up her left knee.  I did an intra-articular injection on July 24 and a pes bursa injection on August 24.    She has been doing a lot of painting at home and has had some recurrence of knee pain over the last couple of weeks.  Pain is only 2 out of 10.  Her second complaint is her right shoulder and arm which has been going on for a number of years she does not recall any trauma there.    She has been taking ibuprofen occasionally but not regularly.    History: Patient's relevant past family, medical, and social history are reviewed as part of today's visit. ROS of pertinent positives and negatives as above; otherwise negative.    General Exam:    Vitals: Height 1.524 m (5'), weight 76.7 kg (169 lb), not currently breastfeeding.  Constitutional: Patient is adequately groomed with no evidence of malnutrition  Mental Status: The patient is oriented to time, place and person.  The patient's mood and affect are appropriate.  Gait:  Patient walks with normal gait and station.  Lymphatic: The lymphatic examination bilaterally reveals all areas to be without enlargement or induration.  Vascular: Examination reveals no swelling or calf tenderness.  Peripheral pulses are palpable and 2+.  Neurological: The patient has good coordination.  There is no weakness or sensory deficit.    Skin:    Both arms are covered in sores that she has been picking at.  She describes herself as a \".    Not appear to be grossly infected.    Right shoulder has some mild tenderness over the bicep.  She has full motion with normal strength.    Left shoulder exam is normal.    Right knee exam is normal.    Left knee has some mild pain at the medial joint line toward the pes bursa but no swelling and no restriction of motion.    Assessment: Right shoulder bicep tendinitis and left knee discomfort.    Plan: Pain is very mild today.  I would recommend meloxicam 15 mg a day and a prescription was provided.

## 2024-11-11 ENCOUNTER — OFFICE VISIT (OUTPATIENT)
Dept: ORTHOPEDIC SURGERY | Age: 39
End: 2024-11-11
Payer: MEDICARE

## 2024-11-11 VITALS — BODY MASS INDEX: 33.18 KG/M2 | WEIGHT: 169 LBS | HEIGHT: 60 IN

## 2024-11-11 DIAGNOSIS — M25.511 CHRONIC RIGHT SHOULDER PAIN: Primary | ICD-10-CM

## 2024-11-11 DIAGNOSIS — M77.8 TENDONITIS OF SHOULDER, RIGHT: ICD-10-CM

## 2024-11-11 DIAGNOSIS — G89.29 CHRONIC RIGHT SHOULDER PAIN: Primary | ICD-10-CM

## 2024-11-11 PROCEDURE — 99213 OFFICE O/P EST LOW 20 MIN: CPT | Performed by: PHYSICIAN ASSISTANT

## 2024-11-11 PROCEDURE — 1036F TOBACCO NON-USER: CPT | Performed by: PHYSICIAN ASSISTANT

## 2024-11-11 RX ORDER — BUPIVACAINE HYDROCHLORIDE 2.5 MG/ML
2 INJECTION, SOLUTION INFILTRATION; PERINEURAL ONCE
Status: COMPLETED | OUTPATIENT
Start: 2024-11-11 | End: 2024-11-11

## 2024-11-11 RX ORDER — TRIAMCINOLONE ACETONIDE 40 MG/ML
40 INJECTION, SUSPENSION INTRA-ARTICULAR; INTRAMUSCULAR ONCE
Status: COMPLETED | OUTPATIENT
Start: 2024-11-11 | End: 2024-11-11

## 2024-11-11 RX ADMIN — BUPIVACAINE HYDROCHLORIDE 5 MG: 2.5 INJECTION, SOLUTION INFILTRATION; PERINEURAL at 13:48

## 2024-11-11 RX ADMIN — TRIAMCINOLONE ACETONIDE 40 MG: 40 INJECTION, SUSPENSION INTRA-ARTICULAR; INTRAMUSCULAR at 13:48

## 2024-11-13 NOTE — PROGRESS NOTES
This dictation was done with Dragon dictation and may contain mechanical errors related to translation.    I have today reviewed with Nkechi Shukla the clinically relevant, past medical history, medications, allergies, family history, social history, and Review Of Systems form the patient’s most recent history form & I have documented any details relevant to today's presenting complaints in my history below. Ms. Nkechi Shukla's self-reported past medical history, medications, allergies, family history, social history, and Review Of Systems form has been scanned into the chart under the \"Media\" tab.    Subjective:  Nkechi Shukla is a 39 y.o. who is here for evaluation of her right shoulder.  She actually saw Dr. Abraham for her knee and shoulder recently had a cortisone shot for the knee which helped tremendously.  They discussed the right shoulder but it was not bothering her as much and just in the last week and a half its become worse pain with overhead and sleeping on it.  Initially sent her for x-rays including an AP transaxillary view and Y view of her right shoulder.  I reviewed these with Dr. Rodgers he does not feel that she is a candidate for reverse surgery.  She was presenting with some rotator cuff tendinitis and impingement type symptoms and we discussed the short and long-term expectations      Patient Active Problem List   Diagnosis    Essential hypertension    Anxiety    AIDP (acute inflammatory demyelinating polyneuropathy) ([facility])    Low serum vitamin D    Symptomatic cholelithiasis    Guillain-[place] ([facility])    Abdominal cramping, generalized    Abnormal ultrasound    Chondromalacia of patella, right    Dizzy spells    Incompetent cervix    Low serum vitamin B12    Sebaceous cyst    Severe pre-eclampsia in third trimester    Vaginal bleeding in pregnancy    Weakness           Current Outpatient Medications on File Prior to Visit   Medication Sig Dispense Refill    clonazePAM (KLONOPIN) 0.5 MG

## 2024-11-19 ENCOUNTER — TELEPHONE (OUTPATIENT)
Dept: ORTHOPEDIC SURGERY | Age: 39
End: 2024-11-19

## 2024-11-19 DIAGNOSIS — M77.8 TENDONITIS OF SHOULDER, RIGHT: Primary | ICD-10-CM

## 2024-11-19 NOTE — TELEPHONE ENCOUNTER
General Question     Subject: MRI ORDER   Patient and /or Facility Request: Nkechi Shukla   Contact Number: [phone]     PATIENT CALLED REQ A MRI ORDER TO BE SENT TO A [facility] THAT IS CLOSE TO [facility] FOR HER RT SHOULDER     PLEASE CALL PATIENT BACK AT THE ABOVE NUMBER

## 2024-11-19 NOTE — TELEPHONE ENCOUNTER
Mela Anthony, MA  P [facility] Clinical [facility]  MRI RIGHT SHOULDER no precert required    I faxed referral to [facility]

## 2024-12-16 ENCOUNTER — OFFICE VISIT (OUTPATIENT)
Dept: ORTHOPEDIC SURGERY | Age: 39
End: 2024-12-16
Payer: MEDICARE

## 2024-12-16 VITALS — HEIGHT: 60 IN | BODY MASS INDEX: 34.75 KG/M2 | WEIGHT: 177 LBS

## 2024-12-16 DIAGNOSIS — M75.121 NONTRAUMATIC COMPLETE TEAR OF RIGHT ROTATOR CUFF: Primary | ICD-10-CM

## 2024-12-16 PROCEDURE — 1036F TOBACCO NON-USER: CPT | Performed by: ORTHOPAEDIC SURGERY

## 2024-12-16 PROCEDURE — G8484 FLU IMMUNIZE NO ADMIN: HCPCS | Performed by: ORTHOPAEDIC SURGERY

## 2024-12-16 PROCEDURE — G8427 DOCREV CUR MEDS BY ELIG CLIN: HCPCS | Performed by: ORTHOPAEDIC SURGERY

## 2024-12-16 PROCEDURE — G8417 CALC BMI ABV UP PARAM F/U: HCPCS | Performed by: ORTHOPAEDIC SURGERY

## 2024-12-16 PROCEDURE — 99214 OFFICE O/P EST MOD 30 MIN: CPT | Performed by: ORTHOPAEDIC SURGERY

## 2024-12-16 NOTE — PROGRESS NOTES
file   Intimate Partner Violence: Unknown (1/20/2024)    Received from [facility], [facility]    Interpersonal Safety     Feel physically or emotionally unsafe where currently live: Not on file     Harm by anyone: Not on file     Emotionally Harmed: Not on file   Housing Stability: Unknown (6/3/2024)    Housing Stability Vital Sign     Unable to Pay for Housing in the Last Year: Not on file     Number of Places Lived in the Last Year: Not on file     Unstable Housing in the Last Year: No       Family History   Problem Relation Age of Onset    Hypertension Mother     Hypertension Father     Coronary Art Dis Father     High Blood Pressure Sister            Physical Examination:      Vital signs:  Ht 1.524 m (5')   Wt 80.3 kg (177 lb)   BMI 34.57 kg/m²     General:   alert, appears stated age, cooperative, and no distress   Right Shoulder   Active ROM:   forward flexion 135, external rotation 80, internal rotation L4.    Left shoulder: forward flexion 180, external rotation 80, internal rotation L4.      Passive ROM:  forward flexion 180    Joint Tenderness:   lateral acromial   Neer:   positive   Moura:   positive   Strength:   5/5 Supraspinatus, External rotation    Bilateral shoulders   Drop-arm test:   negative   Belly-press test:   negative   Bear-hug test:   negative   Speed's test:   negative   Bicipital groove tenderness:  negative   Reyes's test:   negative   Cross-body adduction test:   negative    AC joint tenderness:   negative     There are no skin lesions, cellulitis, or extreme edema in the upper extremities. Sensation is grossly intact to light touch bilaterally upper extremity. The patient has warm and well-perfused Bilateral upper extremities with brisk capillary refill.      Imaging   Right Shoulder X-Ray: reviewed  AC Joint: narrowing moderate  Glenohumeral joint: no abnormalities noted  Elevation humeral head: absent    Right Shoulder

## 2024-12-17 ENCOUNTER — TELEPHONE (OUTPATIENT)
Dept: ORTHOPEDIC SURGERY | Age: 39
End: 2024-12-17

## 2024-12-18 ENCOUNTER — TELEPHONE (OUTPATIENT)
Dept: ORTHOPEDIC SURGERY | Age: 39
End: 2024-12-18

## 2024-12-18 ENCOUNTER — PREP FOR PROCEDURE (OUTPATIENT)
Dept: ORTHOPEDIC SURGERY | Age: 39
End: 2024-12-18

## 2024-12-18 PROBLEM — M75.121 COMPLETE TEAR OF RIGHT ROTATOR CUFF: Status: ACTIVE | Noted: 2024-12-18

## 2024-12-18 RX ORDER — SODIUM CHLORIDE 0.9 % (FLUSH) 0.9 %
5-40 SYRINGE (ML) INJECTION PRN
Status: CANCELLED | OUTPATIENT
Start: 2024-12-18

## 2024-12-18 RX ORDER — SODIUM CHLORIDE 0.9 % (FLUSH) 0.9 %
5-40 SYRINGE (ML) INJECTION EVERY 12 HOURS SCHEDULED
Status: CANCELLED | OUTPATIENT
Start: 2024-12-18

## 2024-12-18 RX ORDER — SODIUM CHLORIDE 9 MG/ML
INJECTION, SOLUTION INTRAVENOUS PRN
Status: CANCELLED | OUTPATIENT
Start: 2024-12-18

## 2024-12-18 NOTE — PROGRESS NOTES
[facility] PRE-OPERATIVE INSTRUCTIONS    Day of Procedure: 1/10/24               Arrival time:   PER DR. GUAJARDO'S OFFICE             Surgery time:    Take the following medications with a sip of water:  Follow your MD/Surgeons pre-procedure instructions regarding your medications     Do not eat or drink anything after 12:00 midnight prior to your surgery.  This includes water, chewing gum, mints and ice chips.   You may brush your teeth and gargle the morning of your surgery, but do not swallow the water.     Please see your family doctor/pediatrician for a history and physical and/or concerning medications.   Bring any test results/reports from your physicians office.   If you are under the care of a heart doctor or specialist doctor, please be aware that you may be asked to see them for clearance.    You may be asked to stop blood thinners such as Coumadin, Plavix, Fragmin, Lovenox, etc., or any anti-inflammatories such as:  Aspirin, Ibuprofen, Advil, Naproxen prior to your surgery.    We also ask that you stop any over the counter medications such as fish oil, vitamin E, glucosamine, garlic, Multivitamins, COQ 10, etc. MAY TAKE TYLENOL    We ask that you do not smoke 24 hours prior to surgery.  We ask that you do not  drink any alcoholic beverages 24 hours prior to surgery     You must make arrangements for a responsible adult to take you home after your surgery.    For your safety, you will not be allowed to leave alone or drive yourself home.  Your surgery will be cancelled if you do not have a ride home.     Also for your safety, you must have someone stay with you the first 24 hours after your surgery.     A parent or legal guardian must accompany a child scheduled for surgery and plan to stay at the hospital until the child is discharged.    Please do not bring other children with you.    For your comfort, please wear simple loose fitting clothing to the hospital.  Please do not bring valuables.

## 2024-12-27 ENCOUNTER — TELEPHONE (OUTPATIENT)
Dept: ORTHOPEDIC SURGERY | Age: 39
End: 2024-12-27

## 2024-12-27 NOTE — TELEPHONE ENCOUNTER
Called and lm/ that patient needs to just have a clearance from Dr. Noonan if they want to order tests that is up to them. We just need a office note that states that she is medically cleared for surgery.

## 2024-12-27 NOTE — TELEPHONE ENCOUNTER
Medical Facility Question     Facility Name: [facility]  Contact Name: LOIDA PATEL   Contact Number: [phone]  Request or Information: PRE OP WORK     LOIDA CALLING TO SHE WHAT ALL PATIENT NEEDS DONE FOR PRE OP     PATIENT STATED HAVE NOT RECEIVED THE PRE OP PAPERWORK     PATIENT ABBIE ON 1/10/25    PLEASE SEND OVER TO [phone]

## 2025-01-09 ENCOUNTER — ANESTHESIA EVENT (OUTPATIENT)
Dept: OPERATING ROOM | Age: 40
End: 2025-01-09
Payer: MEDICARE

## 2025-01-09 NOTE — H&P
Preoperative H&P Update    The patient's History and Physical in the medical record from 12/27/24 was reviewed by me today.  I reviewed the HPI, medications, allergies, reason for surgery, diagnosis and treatment plan and there has been no interval change.    The patient was examined by me today. Physical exam findings for this update include:    BP (!) 165/103   Pulse 90   Temp 97.9 °F (36.6 °C) (Temporal)   Resp 16   Ht 1.524 m (5')   Wt 80.3 kg (177 lb)   LMP 12/04/2024   SpO2 99%   BMI 34.57 kg/m²   Airway is intact  Chest: breathing comfortably  Heart: regular rate  Findings on exam of the body region where surgery is to be performed include: see last office and/or consult note      A prescription monitoring report was reviewed for the patient.       I had another extensive discussion with the patient about her shoulder. She again stated that she did not have an obvious injury to her shoulder. She stated she had fallen a few times in the past, but denied any immediate shoulder pain after the falls.   She denies known history of connective tissue or rheumatologic disease.   Review of her chart, demonstrates she had been on 3 courses of oral steroid in the last 2 years. Will plan on proceeding with rotator cuff repair, but will plan to perform bioinductive implant augmentation because of the atypical nature (young, atraumatic, no obvious systemic etiology) of her tear.            Electronically signed by Heriberto Bran MD on 1/10/2025 at 6:44 AM

## 2025-01-09 NOTE — DISCHARGE INSTRUCTIONS
DR. GUAJARDO'S                  SHOULDER ARTHROSCOPY POSTOPERATIVE INSTRUCTIONS      ACTIVITIES:  Keep arm in sling after surgery. You may move your wrist and hand as much possible. Dr Guajardo will tell you when you can stop wearing the sling.         DRESSING: After surgery, a bulky dressing, and sometimes an ice cuff will be applied to your shoulder. It is normal to have a moderate amount of blood-tinted fluid drainage on the dressing. Keep the dressing clean and dry.          ICE/COOLING: Apply ice to your shoulder if you did not purchase an ice cuff. If you did purchase a cooling cuff, follow the instructions included with the cuff to keep it cold.  Wear the cuff continuously for 72 hours postoperatively.  You can ice intermittently (such as 30 minutes on, 30 minutes off).   Make sure the ice or cooling cuff is not directly in contact with your skin.  Prolonged contact can result in frostbite.   After 3 days, use after exercising, or to help with pain and swelling, for 30 minutes, 3 to 5 times a day.      DRIVING:  You may drive once you are out of your sling, have stopped your pain medication, and can use your shoulder normally. This may be a decision to be made between you and your physical therapist. You must be able to control the steering wheel comfortably. You are the one ultimately responsible when behind the wheel.    SHOWERING: You may begin to shower 3 days after your surgery. Keep the incisions covered.  Dr Guajardo will tell you if you need to wait longer.    MEDICATION: Although you have a prescription for a strong pain medication, many patients are able to handle the discomfort postoperatively with a milder medication such as Extra-strength Tylenol. You may or may not (based on your surgery - Dr. Guajardo will let you know) also have had a prescription for an anti-inflammatory such as Celebrex or Naprosyn, and an anti-nausea medication such as Phenergan.

## 2025-01-10 ENCOUNTER — HOSPITAL ENCOUNTER (OUTPATIENT)
Age: 40
Setting detail: OUTPATIENT SURGERY
Discharge: HOME | End: 2025-01-10
Attending: ORTHOPAEDIC SURGERY | Admitting: ORTHOPAEDIC SURGERY
Payer: MEDICARE

## 2025-01-10 ENCOUNTER — ANESTHESIA (OUTPATIENT)
Dept: OPERATING ROOM | Age: 40
End: 2025-01-10
Payer: MEDICARE

## 2025-01-10 VITALS
HEART RATE: 86 BPM | HEIGHT: 60 IN | TEMPERATURE: 97.3 F | WEIGHT: 177 LBS | RESPIRATION RATE: 15 BRPM | OXYGEN SATURATION: 95 % | DIASTOLIC BLOOD PRESSURE: 82 MMHG | BODY MASS INDEX: 34.75 KG/M2 | SYSTOLIC BLOOD PRESSURE: 125 MMHG

## 2025-01-10 DIAGNOSIS — M75.121 NONTRAUMATIC COMPLETE TEAR OF RIGHT ROTATOR CUFF: Primary | ICD-10-CM

## 2025-01-10 LAB — HCG UR QL: NEGATIVE

## 2025-01-10 PROCEDURE — 6360000002 HC RX W HCPCS: Performed by: ORTHOPAEDIC SURGERY

## 2025-01-10 PROCEDURE — 2709999900 HC NON-CHARGEABLE SUPPLY: Performed by: ORTHOPAEDIC SURGERY

## 2025-01-10 PROCEDURE — 6360000002 HC RX W HCPCS: Performed by: ANESTHESIOLOGY

## 2025-01-10 PROCEDURE — C1713 ANCHOR/SCREW BN/BN,TIS/BN: HCPCS | Performed by: ORTHOPAEDIC SURGERY

## 2025-01-10 PROCEDURE — 7100000000 HC PACU RECOVERY - FIRST 15 MIN: Performed by: ORTHOPAEDIC SURGERY

## 2025-01-10 PROCEDURE — 3700000001 HC ADD 15 MINUTES (ANESTHESIA): Performed by: ORTHOPAEDIC SURGERY

## 2025-01-10 PROCEDURE — 3600000004 HC SURGERY LEVEL 4 BASE: Performed by: ORTHOPAEDIC SURGERY

## 2025-01-10 PROCEDURE — C1763 CONN TISS, NON-HUMAN: HCPCS | Performed by: ORTHOPAEDIC SURGERY

## 2025-01-10 PROCEDURE — 3700000000 HC ANESTHESIA ATTENDED CARE: Performed by: ORTHOPAEDIC SURGERY

## 2025-01-10 PROCEDURE — 2580000003 HC RX 258: Performed by: ORTHOPAEDIC SURGERY

## 2025-01-10 PROCEDURE — 2500000003 HC RX 250 WO HCPCS: Performed by: ORTHOPAEDIC SURGERY

## 2025-01-10 PROCEDURE — 6370000000 HC RX 637 (ALT 250 FOR IP): Performed by: ORTHOPAEDIC SURGERY

## 2025-01-10 PROCEDURE — L3650 SO 8 ABD RESTRAINT PRE OTS: HCPCS | Performed by: ORTHOPAEDIC SURGERY

## 2025-01-10 PROCEDURE — 2500000003 HC RX 250 WO HCPCS

## 2025-01-10 PROCEDURE — 29827 SHO ARTHRS SRG RT8TR CUF RPR: CPT | Performed by: ORTHOPAEDIC SURGERY

## 2025-01-10 PROCEDURE — 7100000001 HC PACU RECOVERY - ADDTL 15 MIN: Performed by: ORTHOPAEDIC SURGERY

## 2025-01-10 PROCEDURE — 29826 SHO ARTHRS SRG DECOMPRESSION: CPT | Performed by: ORTHOPAEDIC SURGERY

## 2025-01-10 PROCEDURE — 6370000000 HC RX 637 (ALT 250 FOR IP): Performed by: ANESTHESIOLOGY

## 2025-01-10 PROCEDURE — 2580000003 HC RX 258: Performed by: ANESTHESIOLOGY

## 2025-01-10 PROCEDURE — 6360000002 HC RX W HCPCS

## 2025-01-10 PROCEDURE — 84703 CHORIONIC GONADOTROPIN ASSAY: CPT

## 2025-01-10 PROCEDURE — 2720000010 HC SURG SUPPLY STERILE: Performed by: ORTHOPAEDIC SURGERY

## 2025-01-10 PROCEDURE — 7100000011 HC PHASE II RECOVERY - ADDTL 15 MIN: Performed by: ORTHOPAEDIC SURGERY

## 2025-01-10 PROCEDURE — 3600000014 HC SURGERY LEVEL 4 ADDTL 15MIN: Performed by: ORTHOPAEDIC SURGERY

## 2025-01-10 PROCEDURE — 64415 NJX AA&/STRD BRCH PLXS IMG: CPT | Performed by: ANESTHESIOLOGY

## 2025-01-10 PROCEDURE — 7100000010 HC PHASE II RECOVERY - FIRST 15 MIN: Performed by: ORTHOPAEDIC SURGERY

## 2025-01-10 DEVICE — BIO-COMP SWIVELOCK SP, 5.5X24.5MM
Type: IMPLANTABLE DEVICE | Site: SHOULDER | Status: FUNCTIONAL
Brand: ARTHREX®

## 2025-01-10 DEVICE — IMPLANTABLE DEVICE
Type: IMPLANTABLE DEVICE | Site: SHOULDER | Status: FUNCTIONAL
Brand: BIOINDUCTIVE IMPLANT WITH ARTHROSCOPIC DELIVERY SYSTEM - LARGE

## 2025-01-10 DEVICE — SP FBRTAK RC TGRTPE WH/BLK & STTPE WH/BL
Type: IMPLANTABLE DEVICE | Site: SHOULDER | Status: FUNCTIONAL
Brand: ARTHREX®

## 2025-01-10 DEVICE — BONE ANCHORS 3 WITH ARTHROSCOPIC DELIVERY SYSTEM ADVANCED
Type: IMPLANTABLE DEVICE | Site: SHOULDER | Status: FUNCTIONAL
Brand: BONE ANCHORS WITH ARTHROSCOPIC DELIVERY SYSTEM - ADVANCED

## 2025-01-10 DEVICE — IMPLANTABLE DEVICE
Type: IMPLANTABLE DEVICE | Site: SHOULDER | Status: FUNCTIONAL
Brand: ROTATION MEDICAL TENDON STAPLES

## 2025-01-10 DEVICE — SP FBRTAK RC FBRTPE BLU & STTPE WH/BLK
Type: IMPLANTABLE DEVICE | Site: SHOULDER | Status: FUNCTIONAL
Brand: ARTHREX®

## 2025-01-10 RX ORDER — ACETAMINOPHEN 325 MG/1
650 TABLET ORAL
Status: DISCONTINUED | OUTPATIENT
Start: 2025-01-10 | End: 2025-01-10 | Stop reason: HOSPADM

## 2025-01-10 RX ORDER — BUPIVACAINE HYDROCHLORIDE 2.5 MG/ML
INJECTION, SOLUTION EPIDURAL; INFILTRATION; INTRACAUDAL
Status: COMPLETED | OUTPATIENT
Start: 2025-01-10 | End: 2025-01-10

## 2025-01-10 RX ORDER — MAGNESIUM HYDROXIDE 1200 MG/15ML
LIQUID ORAL CONTINUOUS PRN
Status: DISCONTINUED | OUTPATIENT
Start: 2025-01-10 | End: 2025-01-10 | Stop reason: HOSPADM

## 2025-01-10 RX ORDER — SODIUM CHLORIDE 9 MG/ML
INJECTION, SOLUTION INTRAVENOUS PRN
Status: DISCONTINUED | OUTPATIENT
Start: 2025-01-10 | End: 2025-01-10

## 2025-01-10 RX ORDER — MIDAZOLAM HYDROCHLORIDE 1 MG/ML
INJECTION, SOLUTION INTRAMUSCULAR; INTRAVENOUS
Status: DISCONTINUED | OUTPATIENT
Start: 2025-01-10 | End: 2025-01-10 | Stop reason: SDUPTHER

## 2025-01-10 RX ORDER — SODIUM CHLORIDE 0.9 % (FLUSH) 0.9 %
5-40 SYRINGE (ML) INJECTION EVERY 12 HOURS SCHEDULED
Status: DISCONTINUED | OUTPATIENT
Start: 2025-01-10 | End: 2025-01-10

## 2025-01-10 RX ORDER — PROPOFOL 10 MG/ML
INJECTION, EMULSION INTRAVENOUS
Status: DISCONTINUED | OUTPATIENT
Start: 2025-01-10 | End: 2025-01-10 | Stop reason: SDUPTHER

## 2025-01-10 RX ORDER — LABETALOL HYDROCHLORIDE 5 MG/ML
INJECTION, SOLUTION INTRAVENOUS
Status: DISCONTINUED | OUTPATIENT
Start: 2025-01-10 | End: 2025-01-10 | Stop reason: SDUPTHER

## 2025-01-10 RX ORDER — LIDOCAINE HYDROCHLORIDE 20 MG/ML
INJECTION, SOLUTION EPIDURAL; INFILTRATION; INTRACAUDAL; PERINEURAL
Status: DISCONTINUED | OUTPATIENT
Start: 2025-01-10 | End: 2025-01-10 | Stop reason: SDUPTHER

## 2025-01-10 RX ORDER — POVIDONE-IODINE 10 MG/G
OINTMENT TOPICAL
Status: COMPLETED | OUTPATIENT
Start: 2025-01-10 | End: 2025-01-10

## 2025-01-10 RX ORDER — VANCOMYCIN 1.5 G/300ML
1500 INJECTION, SOLUTION INTRAVENOUS
Status: DISCONTINUED | OUTPATIENT
Start: 2025-01-10 | End: 2025-01-10 | Stop reason: ALTCHOICE

## 2025-01-10 RX ORDER — DEXAMETHASONE SODIUM PHOSPHATE 4 MG/ML
INJECTION, SOLUTION INTRA-ARTICULAR; INTRALESIONAL; INTRAMUSCULAR; INTRAVENOUS; SOFT TISSUE
Status: DISCONTINUED | OUTPATIENT
Start: 2025-01-10 | End: 2025-01-10 | Stop reason: SDUPTHER

## 2025-01-10 RX ORDER — OXYCODONE HYDROCHLORIDE 5 MG/1
5 TABLET ORAL PRN
Status: COMPLETED | OUTPATIENT
Start: 2025-01-10 | End: 2025-01-10

## 2025-01-10 RX ORDER — OXYCODONE HYDROCHLORIDE 10 MG/1
10 TABLET ORAL PRN
Status: COMPLETED | OUTPATIENT
Start: 2025-01-10 | End: 2025-01-10

## 2025-01-10 RX ORDER — PROCHLORPERAZINE EDISYLATE 5 MG/ML
5 INJECTION INTRAMUSCULAR; INTRAVENOUS
Status: DISCONTINUED | OUTPATIENT
Start: 2025-01-10 | End: 2025-01-10 | Stop reason: HOSPADM

## 2025-01-10 RX ORDER — ONDANSETRON 2 MG/ML
INJECTION INTRAMUSCULAR; INTRAVENOUS
Status: DISCONTINUED | OUTPATIENT
Start: 2025-01-10 | End: 2025-01-10 | Stop reason: SDUPTHER

## 2025-01-10 RX ORDER — SODIUM CHLORIDE 0.9 % (FLUSH) 0.9 %
5-40 SYRINGE (ML) INJECTION PRN
Status: DISCONTINUED | OUTPATIENT
Start: 2025-01-10 | End: 2025-01-10

## 2025-01-10 RX ORDER — BUPIVACAINE HYDROCHLORIDE 2.5 MG/ML
INJECTION, SOLUTION EPIDURAL; INFILTRATION; INTRACAUDAL
Status: COMPLETED
Start: 2025-01-10 | End: 2025-01-10

## 2025-01-10 RX ORDER — IBUPROFEN 400 MG/1
800 TABLET, FILM COATED ORAL
Status: COMPLETED | OUTPATIENT
Start: 2025-01-10 | End: 2025-01-10

## 2025-01-10 RX ORDER — ROCURONIUM BROMIDE 10 MG/ML
INJECTION, SOLUTION INTRAVENOUS
Status: DISCONTINUED | OUTPATIENT
Start: 2025-01-10 | End: 2025-01-10 | Stop reason: SDUPTHER

## 2025-01-10 RX ORDER — SODIUM CHLORIDE 9 MG/ML
INJECTION, SOLUTION INTRAVENOUS PRN
Status: DISCONTINUED | OUTPATIENT
Start: 2025-01-10 | End: 2025-01-10 | Stop reason: HOSPADM

## 2025-01-10 RX ORDER — FENTANYL CITRATE 50 UG/ML
INJECTION, SOLUTION INTRAMUSCULAR; INTRAVENOUS
Status: DISCONTINUED | OUTPATIENT
Start: 2025-01-10 | End: 2025-01-10 | Stop reason: SDUPTHER

## 2025-01-10 RX ORDER — SODIUM CHLORIDE 0.9 % (FLUSH) 0.9 %
5-40 SYRINGE (ML) INJECTION EVERY 12 HOURS SCHEDULED
Status: DISCONTINUED | OUTPATIENT
Start: 2025-01-10 | End: 2025-01-10 | Stop reason: HOSPADM

## 2025-01-10 RX ORDER — PROMETHAZINE HYDROCHLORIDE 25 MG/1
25 TABLET ORAL EVERY 6 HOURS PRN
Qty: 5 TABLET | Refills: 0 | Status: SHIPPED | OUTPATIENT
Start: 2025-01-10

## 2025-01-10 RX ORDER — ONDANSETRON 2 MG/ML
4 INJECTION INTRAMUSCULAR; INTRAVENOUS
Status: DISCONTINUED | OUTPATIENT
Start: 2025-01-10 | End: 2025-01-10 | Stop reason: HOSPADM

## 2025-01-10 RX ORDER — FENTANYL CITRATE 0.05 MG/ML
50 INJECTION, SOLUTION INTRAMUSCULAR; INTRAVENOUS EVERY 5 MIN PRN
Status: DISCONTINUED | OUTPATIENT
Start: 2025-01-10 | End: 2025-01-10 | Stop reason: HOSPADM

## 2025-01-10 RX ORDER — SODIUM CHLORIDE 0.9 % (FLUSH) 0.9 %
5-40 SYRINGE (ML) INJECTION PRN
Status: DISCONTINUED | OUTPATIENT
Start: 2025-01-10 | End: 2025-01-10 | Stop reason: HOSPADM

## 2025-01-10 RX ORDER — NALOXONE HYDROCHLORIDE 0.4 MG/ML
INJECTION, SOLUTION INTRAMUSCULAR; INTRAVENOUS; SUBCUTANEOUS PRN
Status: DISCONTINUED | OUTPATIENT
Start: 2025-01-10 | End: 2025-01-10 | Stop reason: HOSPADM

## 2025-01-10 RX ORDER — OXYCODONE AND ACETAMINOPHEN 5; 325 MG/1; MG/1
1 TABLET ORAL EVERY 4 HOURS PRN
Qty: 40 TABLET | Refills: 0 | Status: SHIPPED | OUTPATIENT
Start: 2025-01-10 | End: 2025-01-17

## 2025-01-10 RX ADMIN — LIDOCAINE HYDROCHLORIDE 100 MG: 20 INJECTION, SOLUTION EPIDURAL; INFILTRATION; INTRACAUDAL; PERINEURAL at 07:37

## 2025-01-10 RX ADMIN — LABETALOL HYDROCHLORIDE 5 MG: 5 INJECTION, SOLUTION INTRAVENOUS at 08:07

## 2025-01-10 RX ADMIN — ONDANSETRON 4 MG: 2 INJECTION INTRAMUSCULAR; INTRAVENOUS at 08:50

## 2025-01-10 RX ADMIN — SODIUM CHLORIDE: 9 INJECTION, SOLUTION INTRAVENOUS at 07:16

## 2025-01-10 RX ADMIN — ONDANSETRON 4 MG: 2 INJECTION INTRAMUSCULAR; INTRAVENOUS at 07:16

## 2025-01-10 RX ADMIN — Medication 20 MG: at 08:26

## 2025-01-10 RX ADMIN — SUGAMMADEX 200 MG: 100 INJECTION, SOLUTION INTRAVENOUS at 08:39

## 2025-01-10 RX ADMIN — HYDROMORPHONE HYDROCHLORIDE 0.25 MG: 1 INJECTION, SOLUTION INTRAMUSCULAR; INTRAVENOUS; SUBCUTANEOUS at 09:36

## 2025-01-10 RX ADMIN — SODIUM CHLORIDE 2000 MG: 900 INJECTION INTRAVENOUS at 07:46

## 2025-01-10 RX ADMIN — OXYCODONE 5 MG: 5 TABLET ORAL at 11:13

## 2025-01-10 RX ADMIN — ROCURONIUM BROMIDE 50 MG: 10 INJECTION, SOLUTION INTRAVENOUS at 07:40

## 2025-01-10 RX ADMIN — MIDAZOLAM 2 MG: 1 INJECTION INTRAMUSCULAR; INTRAVENOUS at 07:16

## 2025-01-10 RX ADMIN — PROPOFOL 200 MG: 10 INJECTION, EMULSION INTRAVENOUS at 07:37

## 2025-01-10 RX ADMIN — BUPIVACAINE HYDROCHLORIDE 20 ML: 2.5 INJECTION, SOLUTION EPIDURAL; INFILTRATION; INTRACAUDAL at 07:05

## 2025-01-10 RX ADMIN — FENTANYL CITRATE 100 MCG: 50 INJECTION INTRAMUSCULAR; INTRAVENOUS at 07:16

## 2025-01-10 RX ADMIN — IBUPROFEN 800 MG: 400 TABLET, FILM COATED ORAL at 11:13

## 2025-01-10 RX ADMIN — DEXAMETHASONE SODIUM PHOSPHATE 10 MG: 4 INJECTION, SOLUTION INTRAMUSCULAR; INTRAVENOUS at 07:43

## 2025-01-10 ASSESSMENT — PAIN DESCRIPTION - DESCRIPTORS
DESCRIPTORS: ACHING
DESCRIPTORS: DULL
DESCRIPTORS: SHARP
DESCRIPTORS: DULL

## 2025-01-10 ASSESSMENT — PAIN DESCRIPTION - ORIENTATION
ORIENTATION: RIGHT

## 2025-01-10 ASSESSMENT — PAIN DESCRIPTION - ONSET
ONSET: ON-GOING

## 2025-01-10 ASSESSMENT — PAIN DESCRIPTION - LOCATION
LOCATION: SHOULDER
LOCATION: ARM

## 2025-01-10 ASSESSMENT — PAIN SCALES - GENERAL
PAINLEVEL_OUTOF10: 4
PAINLEVEL_OUTOF10: 5
PAINLEVEL_OUTOF10: 4
PAINLEVEL_OUTOF10: 0

## 2025-01-10 ASSESSMENT — PAIN DESCRIPTION - PAIN TYPE
TYPE: SURGICAL PAIN

## 2025-01-10 ASSESSMENT — PAIN - FUNCTIONAL ASSESSMENT
PAIN_FUNCTIONAL_ASSESSMENT: 0-10
PAIN_FUNCTIONAL_ASSESSMENT: ACTIVITIES ARE NOT PREVENTED

## 2025-01-10 ASSESSMENT — PAIN DESCRIPTION - FREQUENCY
FREQUENCY: CONTINUOUS

## 2025-01-10 NOTE — PROGRESS NOTES
Pt to phase 2 alert and oriented, pain 4/10, moves fingers to command, reports some numbness/tingling in right arm. 2+ radial pulses. Vitals stable on room air. Up to bathroom with assistance. To recliner for PO snacks.  to bedside.

## 2025-01-10 NOTE — ANESTHESIA POSTPROCEDURE EVALUATION
Department of Anesthesiology  Postprocedure Note    Patient: Nkechi Shukla  MRN: [id number]  YOB: 1985  Date of evaluation: 1/10/2025    Procedure Summary       Date: 01/10/25 Room / Location: [address]    Anesthesia Start: 0716 Anesthesia Stop: 0906    Procedures:       RIGHT SHOULDER ARTHROSCOPY SUBACROMIAL DECOMPRESSION (Right)      ROTATOR CUFF REPAIR POSSIBLE BIO INDUCTIVE IMPLANT AUGMENTATION (Right) Diagnosis:       Complete tear of right rotator cuff      (Complete tear of right rotator cuff [M75.121])    Surgeons: Heriberto Bran MD Responsible Provider: Daysi Rodgers MD    Anesthesia Type: general ASA Status: 3            Anesthesia Type: No value filed.    Gabriel Phase I: Gabriel Score: 10    Gabriel Phase II: Gabriel Score: 10    Anesthesia Post Evaluation    Patient location during evaluation: bedside  Patient participation: complete - patient participated  Level of consciousness: awake and alert  Pain score: 4  Airway patency: patent  Nausea & Vomiting: no vomiting  Cardiovascular status: blood pressure returned to baseline  Respiratory status: acceptable  Hydration status: euvolemic  Pain management: adequate    No notable events documented.

## 2025-01-10 NOTE — ANESTHESIA PROCEDURE NOTES
Peripheral Block    Patient location during procedure: pre-op  Reason for block: post-op pain management and at surgeon's request  Start time: 1/10/2025 7:05 AM  End time: 1/10/2025 7:10 AM  Staffing  Performed: anesthesiologist   Anesthesiologist: Daysi Rodgers MD  Performed by: Daysi Rodgers MD  Authorized by: Daysi Rodgers MD    Preanesthetic Checklist  Completed: patient identified, IV checked, site marked, risks and benefits discussed, surgical/procedural consents, equipment checked, pre-op evaluation, timeout performed, anesthesia consent given, oxygen available and monitors applied/VS acknowledged  Peripheral Block   Patient position: sitting  Prep: ChloraPrep  Provider prep: mask and sterile gloves  Patient monitoring: cardiac monitor, continuous pulse ox, frequent blood pressure checks and IV access  Block type: Brachial plexus  Interscalene  Laterality: right  Injection technique: single-shot  Guidance: ultrasound guided  Local infiltration: lidocaine  Infiltration strength: 1 %  Local infiltration: lidocaine  Dose: 3 mL    Needle   Needle type: insulated echogenic nerve stimulator needle   Needle gauge: 22 G  Needle localization: ultrasound guidance  Needle length: 5 cm  Assessment   Injection assessment: negative aspiration for heme, no paresthesia on injection and local visualized surrounding nerve on ultrasound  Paresthesia pain: none  Slow fractionated injection: yes  Hemodynamics: stable  Outcomes: uncomplicated    Additional Notes  Immediately prior to procedure a \"time out\" was called to verify the correct patient, allergies, laterality, procedure and equipment. Time out performed with CRNA    Local Anesthetic: 0.25 %  Bupivacaine   Amount: 20 ml  in 5 ml increments after negative aspiration each time.    Anterior scalene and middle scalene muscles, upper, middle and lower trunks of the brachial plexus are identified, the tip of the needle and the spread of the local anesthetic around the

## 2025-01-10 NOTE — ANESTHESIA PRE PROCEDURE
[facility] Department of Anesthesiology  Pre-Anesthesia Evaluation/Consultation       Name:  Nkechi Shukla  : 1985  Age:  39 y.o.                                           MRN:  [id number]  Date: 1/10/2025           Surgeon: Surgeon(s):  Heriberto Bran MD    Procedure: Procedure(s):  RIGHT SHOULDER ARTHROSCOPY SUBACROMIAL DECOMPRESSION  ROTATOR CUFF REPAIR POSSIBLE BIO INDUCTIVE IMPLANT AUGMENTATION     Allergies   Allergen Reactions   • Biaxin [Clarithromycin] Nausea Only     Bad taste in mouth   • Lisinopril      Hot flashes     • Methyldopa      headache   • Nifedipine      Headache     • Adhesive Tape Rash     Patient and family deny that patient has a latex allergy. This was specifically asked.   • Atenolol      headache   • Bactrim [Sulfamethoxazole-Trimethoprim] Other (See Comments)     headache  headache   • Doxycycline      Hand numbness   • Other      Patient does have very sensitive skin. Becomes red, blotchy and even may see welts when she is anxious.   • Silver Rash   • Tegaderm Ag Mesh 2\"X2\"  [Wound Dressings] Rash     Patient Active Problem List   Diagnosis   • Essential hypertension   • Anxiety   • AIDP (acute inflammatory demyelinating polyneuropathy) ([facility])   • Low serum vitamin D   • Symptomatic cholelithiasis   • Guillain-[place] ([facility])   • Abdominal cramping, generalized   • Abnormal ultrasound   • Chondromalacia of patella, right   • Dizzy spells   • Incompetent cervix   • Low serum vitamin B12   • Sebaceous cyst   • Severe pre-eclampsia in third trimester   • Vaginal bleeding in pregnancy   • Weakness   • Complete tear of right rotator cuff     Past Medical History:   Diagnosis Date   • AIDP (acute inflammatory demyelinating polyneuropathy) ([facility]) 2020    Admit to [place]   • Anesthesia complication     after colonoscopy-was lightheaded   • Anxiety    • COVID    • Depression    • Guillain Barré syndrome ([facility])    • Heartburn    • HTN (hypertension)    • Wears contact lenses    •

## 2025-01-10 NOTE — PROGRESS NOTES
Pt awake, multiple complaints - nausea, pain, dizziness, inability to move right arm.  Dr. Rodgers at bedside.  Discussed pt status and plan of care with pt and Dr. Rodgers.  See eMar.  Pt medicated for pain. Pt uses IS with poor effort - 200 ml times 3.

## 2025-01-10 NOTE — PROGRESS NOTES
Pt awake, states pain 4/10 and tolerable.  Nausea has eased.  Anali po ice chips well.  Uses IS with fair effort - 100 ml times 4. To phase 2 care.

## 2025-01-10 NOTE — PROGRESS NOTES
To pacu from OR.  PT asleep. Dressings to right shoulder dry and intact.  Right arm in sling.  Radial pulses palpable.  IV infusing.  Monitor in sinus rhythm.

## 2025-01-10 NOTE — BRIEF OP NOTE
Brief Postoperative Note      Patient: Nkechi Shukla  YOB: 1985  MRN: [id number]    Date of Procedure: 1/10/2025    Pre-Op Diagnosis Codes:      * Complete tear of right rotator cuff [M75.121]    Post-Op Diagnosis: Same       Procedure(s):  RIGHT SHOULDER ARTHROSCOPY SUBACROMIAL DECOMPRESSION  ROTATOR CUFF REPAIR, BIOINDUCTIVE IMPLANT AUGMENTATION    Surgeon(s):  Heriberto Bran MD    Assistant:  Surgical Assistant: Dimple Gallo    Anesthesia: General + block    Estimated Blood Loss (mL): Minimal    Complications: None    Specimens:   * No specimens in log *    Implants:  Implant Name Type Inv. Item Serial No.  Lot No. LRB No. Used Action   ANCHOR BONE SP FBRTAK RC TGRTPE TRUPTI  - [id number]  ANCHOR BONE SP FBRTAK RC TGRTPE TRUPTI   [employer] [id number] Right 1 Implanted   ANCHOR BONE SP FBRTAK RC TGRTPE WH/BLK  - [id number]  ANCHOR BONE SP FBRTAK RC TGRTPE WH/BLK   [employer] [id number] Right 1 Implanted   ANCHOR SUT L24.5MM DIA5.5MM BIOCOMP VENT SELF PUNCHING - [id number]  ANCHOR SUT L24.5MM DIA5.5MM BIOCOMP VENT SELF PUNCHING  [employer] [id number] Right 1 Implanted   ANCHOR SUT L24.5MM DIA5.5MM BIOCOMP VENT SELF PUNCHING - [id number]  ANCHOR SUT L24.5MM DIA5.5MM BIOCOMP VENT SELF PUNCHING  [employer] [id number] Right 1 Implanted   ANCHOR TEND 8 FOR REGENETEN BIOINDUCTIVE IMPL SYS - [id number]  ANCHOR TEND 8 FOR REGENETEN BIOINDUCTIVE IMPL SYS  [employer] [id number] Right 1 Implanted   ANCHOR BONE W/ ARTHSCP DEL SYS ADV - [id number]  ANCHOR BONE W/ ARTHSCP DEL SYS ADV  [employer] [id number] Right 1 Implanted   IMPLANT BIOINDUCTIVE L BOV ACHILLES TEND W/ ARTHSCP DEL SYS - [id number]  IMPLANT BIOINDUCTIVE L BOV ACHILLES TEND W/ ARTHSCP DEL SYS  [employer] [id number] Right 1 Implanted         Drains: * No LDAs found *    Findings:  Infection Present At Time Of Surgery (PATOS) (choose all levels that have infection

## 2025-01-10 NOTE — OP NOTE
Nkechi Shukla (1985)    Date of Surgery- 1/10/2025      Preoperative Diagnosis:  Right shoulder rotator cuff tear                                           Postoperative Diagnosis:  Right shoulder rotator cuff tear     Procedure:  Right shoulder Diagnostic arthroscopy, subacromial decompression, rotator cuff repair with bio inductive implant augmentation      Surgeon: Heriberto Bran MD    Surgical Assistant: Dimple Gallo    Anesthesia: General and interscalene nerve block    Estimated blood loss:  minimal    Complications: none    Disposition:  To PACU in good condition      Indications for Procedure  Vazquez is a 39 y.o. female who was seen in the office for right shoulder pain.  She had no history of injury.  MRI of her shoulder demonstrated full-thickness rotator cuff tear.  She denies any history of connective tissue disease or rheumatologic disease.  She has not been on chronic steroids.We discussed operative and nonoperative treatment options and recommended surgical intervention.  We discussed the risks, benefits, complications, and alternatives of the  procedure. The patient chose to proceed with the procedure. The patient subsequently provided written informed consent for the procedure.  At no time were any guarantees implied or stated.    Site Marking and Surgical Prep  The patient was seen in the preoperative holding area and the appropriate extremity marked.  Interscalene block was administered by the anesthesia department. The patient was taken to the operating room, identified, and transferred onto the operating room table in the supine position.  The patient was then induced under general anesthesia.  Patient received prophylactic preoperative IV antibiotics and had DVT prophylaxis with sequential compression devices on the bilateral lower extremities    Diagnostic Arthroscopy  The operative extremity was then sterilely prepped and draped in the standard fashion.  The arm was placed in an

## 2025-01-10 NOTE — PROGRESS NOTES
CLINICAL PHARMACY NOTE: MEDS TO BEDS    Total # of Prescriptions Filled: 2   The following medications were delivered to the patient:  Current Discharge Medication List        START taking these medications    Details   oxyCODONE-acetaminophen (PERCOCET) 5-325 MG per tablet Take 1 tablet by mouth every 4 hours as needed for Pain for up to 7 days. Max Daily Amount: 6 tablets  Qty: 40 tablet, Refills: 0    Comments: Reduce doses taken as pain becomes manageable  Associated Diagnoses: Nontraumatic complete tear of right rotator cuff      promethazine (PHENERGAN) 25 MG tablet Take 1 tablet by mouth every 6 hours as needed for Nausea  Qty: 5 tablet, Refills: 0               Additional Documentation: pt was told not to take medication while here,  medication was put in chair in room

## 2025-01-10 NOTE — PROGRESS NOTES
Pt up to chair with no issues. Discharge instructions completed with  at bedside. IV removed. Helped dressed from RN and - sling readjusted. PO pain pills given for continued pain control. Pt satisfied with leaving. To car via wheelchair.

## 2025-01-13 ENCOUNTER — OFFICE VISIT (OUTPATIENT)
Dept: ORTHOPEDIC SURGERY | Age: 40
End: 2025-01-13

## 2025-01-13 VITALS — RESPIRATION RATE: 16 BRPM | WEIGHT: 182 LBS | BODY MASS INDEX: 35.73 KG/M2 | HEIGHT: 60 IN

## 2025-01-13 DIAGNOSIS — M75.121 NONTRAUMATIC COMPLETE TEAR OF RIGHT ROTATOR CUFF: Primary | ICD-10-CM

## 2025-01-13 PROCEDURE — 99024 POSTOP FOLLOW-UP VISIT: CPT | Performed by: ORTHOPAEDIC SURGERY

## 2025-01-13 NOTE — PROGRESS NOTES
increased risk for heart attack and stroke. The patient was asked to take the medication with food and to stop if there is any symptoms of GI upset, including heartburn, nausea, increased gas or diarrhea. I asked the patient to contact their medical provider for vomiting, abdominal pain or black/bloody stools. The patient should have renal function testing per his medical provider periodically if the medication is taken on a regular basis.  The patient should be alert for any swelling in the lower extremities and should stop taking the medication immediately and contact their medical provider should this occur.  In addition, the patient should stop taking the medication immediately and contact their medical provider should there be any shortness of breath, fatigue and be evaluated in an emergency facility for any chest pain.  The patient expresses understanding of these issues and questions were answered.     Patient may start showering now. No baths or soaks.  Can leave open to air or apply band-aids to the incisions once out of Tegaderm.        No driving while on pain medication if it causes impairment. Do not recommend driving while shoulder is in sling as this may impair ability to control vehicle.    Return to office at the 2 week postop time period for suture removal and evaluation of progress.          Heriberto Bran MD  Orthopaedic Surgery and Sports Medicine     Disclaimer:  This note was generated with use of a verbal recognition program and an attempt was made to check for errors.  It is possible that there are still dictated errors within this office note.  If so, please bring any significant errors to my attention for an addendum.  All efforts were made to ensure that this office note is accurate.

## 2025-01-16 PROBLEM — G61.81 CIDP (CHRONIC INFLAMMATORY DEMYELINATING POLYNEUROPATHY) (HCC): Status: ACTIVE | Noted: 2025-01-16

## 2025-01-27 ENCOUNTER — OFFICE VISIT (OUTPATIENT)
Dept: ORTHOPEDIC SURGERY | Age: 40
End: 2025-01-27

## 2025-01-27 VITALS — HEIGHT: 60 IN | BODY MASS INDEX: 35.53 KG/M2 | WEIGHT: 181 LBS

## 2025-01-27 DIAGNOSIS — M75.121 NONTRAUMATIC COMPLETE TEAR OF RIGHT ROTATOR CUFF: Primary | ICD-10-CM

## 2025-01-27 PROCEDURE — 99024 POSTOP FOLLOW-UP VISIT: CPT | Performed by: ORTHOPAEDIC SURGERY

## 2025-01-27 NOTE — PROGRESS NOTES
Shoulder Arthroscopy Follow-up  Nkechi Shukla is here for follow up after right shoulder arthroscopic surgery. Surgery date was 1/10/25. Findings at surgery: Medium to large full-thickness rotator cuff tear. Pain is controlled with current analgesics.  Medication(s) being used: Percocet. The patient's pain is rated at 0/10. She has been in a sling and non-weightbearing as instructed.   She states that she did fall 1 week ago, but her arm was in the sling and she really just did the splits with her legs.      Physical Examination:  LMP 12/04/2024    Patient is awake, alert, and in no acute distress.  The incisions are healing.      Assessment:   2 weeks status post right shoulder arthroscopy, subacromial decompression, rotator cuff repair with bioinductive implant augmentation       Plan:   Sutures were removed.  Steri-strips and mastisol were applied.    Patient may start taking baths or soaks at 4 weeks postop    Start physical therapy.  Referral and protocol provided.    The patient may not advance their weight-bearing.    Sling for 6 weeks total after surgery.    Refill pain medications as needed.    OTC NSAIDs as needed.    Ice as needed.    Return to office at the 6 week postop time period for evaluation of progress or prn if problems.        Heriberto Bran MD  Orthopaedic Surgery and Sports Medicine     Disclaimer:  This note was generated with use of a verbal recognition program and an attempt was made to check for errors.  It is possible that there are still dictated errors within this office note.  If so, please bring any significant errors to my attention for an addendum.  All efforts were made to ensure that this office note is accurate.

## 2025-01-29 NOTE — PLAN OF CARE
[facility] - Outpatient Rehabilitation and Therapy: [address], [place] [address] office: [phone] fax: [phone]     Physical Therapy Initial Evaluation Certification      Dear Heriberto Bran MD ,    We had the pleasure of evaluating the following patient for physical therapy services at [facility] Outpatient Physical Therapy.  A summary of our findings can be found in the initial assessment below.  This includes our plan of care.  If you have any questions or concerns regarding these findings, please do not hesitate to contact me at the office phone number listed above.  Thank you for the referral.     Physician Signature:_______________________________Date:__________________  By signing above (or electronic signature), therapist’s plan is approved by physician       Physical Therapy: TREATMENT/PROGRESS NOTE   Patient: Nkechi Shukla (39 y.o. female)   Examination Date: 2025   :  1985 MRN: [id number]   Visit #:   Insurance Allowable Auth Needed   mn []Yes    [x]No    Insurance: Payor: [facility] MEDICARE / Plan: UNITEDHEALTHCARE DUAL COMPLETE / Product Type: *No Product type* /   Insurance ID: [id number] - (Medicare Managed)  Secondary Insurance (if applicable):    Treatment Diagnosis:     ICD-10-CM    1. Decreased functional activity tolerance  R68.89       2. Functional weakness  R53.1       3. Decreased ability to perform activities  R68.89       4. Need for home exercise program  Z78.9       5. Decreased exercise tolerance  R68.89          Medical Diagnosis:  Nontraumatic complete tear of right rotator cuff [M75.121]   Referring Physician: Heriberto Bran MD  PCP: Jose Noonan MD     Plan of care signed (Y/N):     Date of Patient follow up with Physician:      Plan of Care Report: EVAL today  POC update due: (10 visits /OR AUTH LIMITS, whichever is less)  2025                                             Medical History:  Medical

## 2025-01-30 ENCOUNTER — HOSPITAL ENCOUNTER (OUTPATIENT)
Dept: PHYSICAL THERAPY | Age: 40
Setting detail: THERAPIES SERIES
Discharge: HOME | End: 2025-01-30
Attending: ORTHOPAEDIC SURGERY
Payer: MEDICARE

## 2025-01-30 DIAGNOSIS — R68.89 DECREASED ABILITY TO PERFORM ACTIVITIES: ICD-10-CM

## 2025-01-30 DIAGNOSIS — R68.89 DECREASED FUNCTIONAL ACTIVITY TOLERANCE: Primary | ICD-10-CM

## 2025-01-30 DIAGNOSIS — R68.89 DECREASED EXERCISE TOLERANCE: ICD-10-CM

## 2025-01-30 DIAGNOSIS — R53.1 FUNCTIONAL WEAKNESS: ICD-10-CM

## 2025-01-30 DIAGNOSIS — Z78.9 NEED FOR HOME EXERCISE PROGRAM: ICD-10-CM

## 2025-01-30 PROCEDURE — 97140 MANUAL THERAPY 1/> REGIONS: CPT | Performed by: PHYSICAL THERAPIST

## 2025-01-30 PROCEDURE — 97161 PT EVAL LOW COMPLEX 20 MIN: CPT | Performed by: PHYSICAL THERAPIST

## 2025-01-30 PROCEDURE — 97110 THERAPEUTIC EXERCISES: CPT | Performed by: PHYSICAL THERAPIST

## 2025-02-04 ENCOUNTER — HOSPITAL ENCOUNTER (OUTPATIENT)
Dept: PHYSICAL THERAPY | Age: 40
Setting detail: THERAPIES SERIES
Discharge: HOME | End: 2025-02-04
Attending: ORTHOPAEDIC SURGERY
Payer: MEDICARE

## 2025-02-04 PROCEDURE — 97140 MANUAL THERAPY 1/> REGIONS: CPT | Performed by: PHYSICAL THERAPIST

## 2025-02-04 PROCEDURE — 97110 THERAPEUTIC EXERCISES: CPT | Performed by: PHYSICAL THERAPIST

## 2025-02-04 NOTE — FLOWSHEET NOTE
History    Diagnosis Date Comment Source   HTN (hypertension)         Other Medical History    Diagnosis Date Comment Source   AIDP (acute inflammatory demyelinating polyneuropathy) ([facility]) 06/2020 [facility]    Anesthesia complication  after colonoscopy-was lightheaded    Anxiety      COVID      Depression      Guillain Barré syndrome (HCC)      Heartburn      Wears contact lenses      Wears glasses                                                  Precautions/ Contra-indications:           Latex allergy:  NO  Pacemaker:    NO  Contraindications for Manipulation: None and NA  Date of Surgery: 1/10/30  Other:    Red Flags:  None    Suicide Screening:   The patient did not verbalize a primary behavioral concern, suicidal ideation, suicidal intent, or demonstrate suicidal behaviors.    Preferred Language for Healthcare:   [x] English       [] other:  Note:    SUBJECTIVE EXAMINATION     Patient stated complaint: Pt is a 40 y/o female with a hx of right shoulder pain with a resultant right rtc, with bioinductive implant and SAD on 1/10/25.  She has been in a sling since.  She had Gullian [place] syndrome in 2020 and has had some problems since.  She c/o constant aching pain in her shoulder and into her biceps and forearm.  She has not used her arm at all.  She wakes from pain.  She denies n+t.  She hopes to return to normal function and adl's.   2/4/25  Pt states, \" Have been staying in the sling, it's not as bad as it was \"       Test used Initial score  1/29/25 02/04/2025   Pain Summary VAS 0-5 3   Functional questionnaire Upper Extremity functional Scale 0    Other:              Pain:  Pain location: right shoulder   Patient describes pain to be constant, Sharp, aching, burning, squeezing, and throbbing  Pain decreases with: Resting and Ice  Pain increases with: Activity and Movement, Prolonged sitting, Driving, Stretching, Reaching, Pushing, Throwing, and Impact     Living status:

## 2025-02-07 ENCOUNTER — HOSPITAL ENCOUNTER (OUTPATIENT)
Dept: PHYSICAL THERAPY | Age: 40
Setting detail: THERAPIES SERIES
Discharge: HOME | End: 2025-02-07
Attending: ORTHOPAEDIC SURGERY
Payer: MEDICARE

## 2025-02-07 PROCEDURE — 97140 MANUAL THERAPY 1/> REGIONS: CPT

## 2025-02-07 PROCEDURE — 97110 THERAPEUTIC EXERCISES: CPT

## 2025-02-07 NOTE — FLOWSHEET NOTE
impairments as well as improve pain, and restore function.   2/4/25   Better with er today, still very tight.  Told her to make sure she is not using it at home..   2/7/25: Pt needs VC to not lift her R arm with table slides. Pt also uses R shoulder to lift her arm to get out of the sling. Advised patient to not do so. Tactile cuing needed for proper scap retraction. No increased pain with interventions. Most stiffness into ER with PROM.        Physical Therapy Evaluation Complexity Justification  [x] A history of present problem and 3 or more personal factors and/or co-morbidities that impact the plan of care  [x] A total of 1-2 elements  found upon examination of body systems using standardized tests and measures addressing any of the following: body structures, functions (impairments), activity limitations, and/or participation restrictions  [x] A clinical presentation with evolving clinical presentation with changing characteristics  [x] Clinical decision making of LOW (33332 - Typically 20 minutes face-to-face) complexity using standardized patient assessment instrument and/or measurable assessment of functional outcome.    Today's Assessment: See above    Medical Necessity Documentation:  I certify that this patient meets the below criteria necessary for medical necessity for care and/or justification of therapy services:  The patient has functional impairments and/or activity limitations and would benefit from continued outpatient therapy services to address the deficits outlined in the patients goals    Return to Play: NA    Prognosis for POC: [x] Good [] Fair  [] Poor    Patient requires continued skilled intervention: [x] Yes  [] No      CHARGE CAPTURE     PT CHARGE GRID   CPT Code (TIMED) minutes # CPT Code (UNTIMED) #     Therex (12887)  25 2  EVAL:LOW (31802 - Typically 20 minutes face-to-face)     Neuromusc. Re-ed (89818)    Re-Eval (87136)     Manual (12819) 15 1  Estim Unattended (60065)     Ther.

## 2025-02-10 ENCOUNTER — TELEPHONE (OUTPATIENT)
Dept: ORTHOPEDIC SURGERY | Age: 40
End: 2025-02-10

## 2025-02-10 NOTE — TELEPHONE ENCOUNTER
General Question     Subject: CAN Pt TAKE BRACE OFF NOW?   Patient and /or Facility Request: Nkechi Shukla   Contact Number: [phone]      Pt ASKING FOR A CALL BACK TO LET HER KNOW IF THE BRACE NEEDS TO BE WORN ALL THE TIME, OR CAN SHE TAKE IT OFF?

## 2025-02-10 NOTE — TELEPHONE ENCOUNTER
Follows with dermatology, on [facility]. Spoke with patient re: message left about sling-    WORN ALL THE TIME, OR CAN SHE TAKE IT OFF?     Dr. Bran notes he wants her in the sling for 6 weeks after surgery -the 21st  Can remove for hygiene and change of clothes.     Patient understood

## 2025-02-11 ENCOUNTER — HOSPITAL ENCOUNTER (OUTPATIENT)
Dept: PHYSICAL THERAPY | Age: 40
Setting detail: THERAPIES SERIES
Discharge: HOME | End: 2025-02-11
Attending: ORTHOPAEDIC SURGERY
Payer: MEDICARE

## 2025-02-11 PROCEDURE — 97140 MANUAL THERAPY 1/> REGIONS: CPT | Performed by: PHYSICAL THERAPIST

## 2025-02-11 PROCEDURE — 97110 THERAPEUTIC EXERCISES: CPT | Performed by: PHYSICAL THERAPIST

## 2025-02-11 NOTE — FLOWSHEET NOTE
History    Diagnosis Date Comment Source   HTN (hypertension)         Other Medical History    Diagnosis Date Comment Source   AIDP (acute inflammatory demyelinating polyneuropathy) ([facility]) 06/2020 [facility]    Anesthesia complication  after colonoscopy-was lightheaded    Anxiety      COVID      Depression      Guillain Barré syndrome (HCC)      Heartburn      Wears contact lenses      Wears glasses                                                  Precautions/ Contra-indications:           Latex allergy:  NO  Pacemaker:    NO  Contraindications for Manipulation: None and NA  Date of Surgery: 1/10/30  Other:    Red Flags:  None    Suicide Screening:   The patient did not verbalize a primary behavioral concern, suicidal ideation, suicidal intent, or demonstrate suicidal behaviors.    Preferred Language for Healthcare:   [x] English       [] other:  Note:    SUBJECTIVE EXAMINATION     Patient stated complaint: Pt is a 40 y/o female with a hx of right shoulder pain with a resultant right rtc, with bioinductive implant and SAD on 1/10/25.  She has been in a sling since.  She had Gullian [place] syndrome in 2020 and has had some problems since.  She c/o constant aching pain in her shoulder and into her biceps and forearm.  She has not used her arm at all.  She wakes from pain.  She denies n+t.  She hopes to return to normal function and adl's.     2/4/25  Pt states, \" Have been staying in the sling, it's not as bad as it was\"  2/7/25: Pt states her shoulder is more sore today, but she took ibuprofen so her pain is 0/10 currently.   2/11/25  Pt states, \" My neck is stiff, back of my shoulder is sore \"       Test used Initial score  1/29/25 02/11/2025   Pain Summary VAS 0-5 1/10   Functional questionnaire Upper Extremity functional Scale 0    Other:              Pain:  Pain location: right shoulder   Patient describes pain to be constant, Sharp, aching, burning, squeezing, and throbbing  Pain decreases with: Resting and

## 2025-02-13 ENCOUNTER — HOSPITAL ENCOUNTER (OUTPATIENT)
Dept: PHYSICAL THERAPY | Age: 40
Setting detail: THERAPIES SERIES
Discharge: HOME | End: 2025-02-13
Attending: ORTHOPAEDIC SURGERY
Payer: MEDICARE

## 2025-02-13 PROCEDURE — 97140 MANUAL THERAPY 1/> REGIONS: CPT | Performed by: PHYSICAL THERAPIST

## 2025-02-13 PROCEDURE — 97110 THERAPEUTIC EXERCISES: CPT | Performed by: PHYSICAL THERAPIST

## 2025-02-13 NOTE — FLOWSHEET NOTE
Met: [] Not Met: [] Adjusted    Therapist goals for Patient:   Short Term Goals: To be achieved in:4 weeks  1Independent in HEP and progression per patient tolerance, in order to prevent re-injury.   [x] Progressing: [] Met: [] Not Met: [] Adjusted  Patient will have a decrease in pain to <2/10 to facilitate improvement in movement, function, and ADLs as indicated by Functional Deficits.  [x] Progressing: [] Met: [] Not Met: [] Adjusted    Long Term Goals: To be achieved in: 24 weeks  Disability index score of 50 or less for the Upper Extremity functional Scale to assist with reaching prior level of function with activities such as adl's.  [] Progressing: [] Met: [] Not Met: [] Adjusted  Patient will demonstrate increased AROM of right shoulder to 90% normal  without pain to allow for proper joint functioning to enable patient to adl's.   [x] Progressing: [] Met: [] Not Met: [] Adjusted  Patient will demonstrate increased Strength of right ue  to at least 4+/5 throughout without pain to allow for proper functional mobility to enable patient to return to overhead activities.   [] Progressing: [] Met: [] Not Met: [] Adjusted  Patient will return to raising her arm overhead  without increased symptoms or restriction.   [] Progressing: [] Met: [] Not Met: [] Adjusted  \" I want to be able to do my work around the house   [] Progressing: [] Met: [] Not Met: [] Adjusted         Overall Progression Towards Functional goals/ Treatment Progress Update:  [x] Patient is progressing as expected towards functional goals listed.    [] Progression is slowed due to complexities/Impairments listed.  [] Progression has been slowed due to co-morbidities.  [] Plan just implemented, too soon (<30days) to assess goals progression   [] Goals require adjustment due to lack of progress  [] Patient is not progressing as expected and requires additional follow up with physician  [] Other:     TREATMENT PLAN     Frequency/Duration: 2-3x/week

## 2025-02-18 ENCOUNTER — HOSPITAL ENCOUNTER (OUTPATIENT)
Dept: PHYSICAL THERAPY | Age: 40
Setting detail: THERAPIES SERIES
Discharge: HOME | End: 2025-02-18
Attending: ORTHOPAEDIC SURGERY
Payer: MEDICARE

## 2025-02-18 PROCEDURE — 97110 THERAPEUTIC EXERCISES: CPT | Performed by: PHYSICAL THERAPIST

## 2025-02-18 PROCEDURE — 97140 MANUAL THERAPY 1/> REGIONS: CPT | Performed by: PHYSICAL THERAPIST

## 2025-02-18 NOTE — FLOWSHEET NOTE
[facility] - Outpatient Rehabilitation and Therapy: [address], [place] [address] office: [phone] fax: [phone]     Physical Therapy Initial Evaluation Certification      Dear Heriberto Bran MD ,    We had the pleasure of evaluating the following patient for physical therapy services at [facility] Outpatient Physical Therapy.  A summary of our findings can be found in the initial assessment below.  This includes our plan of care.  If you have any questions or concerns regarding these findings, please do not hesitate to contact me at the office phone number listed above.  Thank you for the referral.     Physician Signature:_______________________________Date:__________________  By signing above (or electronic signature), therapist’s plan is approved by physician       Physical Therapy: TREATMENT/PROGRESS NOTE   Patient: Nkechi Shukla (39 y.o. female)   Examination Date: 2025   :  1985 MRN: [id number]   Visit #:   Insurance Allowable Auth Needed   mn []Yes    [x]No    Insurance: Payor: [facility] MEDICARE / Plan: UNITEDHEALTHCARE DUAL COMPLETE / Product Type: *No Product type* /   Insurance ID: [id number] - (Medicare Managed)  Secondary Insurance (if applicable):    Treatment Diagnosis:     ICD-10-CM    1. Decreased functional activity tolerance  R68.89       2. Functional weakness  R53.1       3. Decreased ability to perform activities  R68.89       4. Need for home exercise program  Z78.9       5. Decreased exercise tolerance  R68.89          Medical Diagnosis:  Nontraumatic complete tear of right rotator cuff [M75.121]   Referring Physician: Heriberto Bran MD  PCP: Jose Noonan MD     Plan of care signed (Y/N):     Date of Patient follow up with Physician:      Plan of Care Report: NO  POC update due: (10 visits /OR AUTH LIMITS, whichever is less)  3/3/2025                                             Medical History:  Medical

## 2025-02-20 ENCOUNTER — HOSPITAL ENCOUNTER (OUTPATIENT)
Dept: PHYSICAL THERAPY | Age: 40
Setting detail: THERAPIES SERIES
Discharge: HOME | End: 2025-02-20
Attending: ORTHOPAEDIC SURGERY
Payer: MEDICARE

## 2025-02-20 PROCEDURE — 97110 THERAPEUTIC EXERCISES: CPT | Performed by: PHYSICAL THERAPIST

## 2025-02-20 PROCEDURE — 97140 MANUAL THERAPY 1/> REGIONS: CPT | Performed by: PHYSICAL THERAPIST

## 2025-02-20 NOTE — FLOWSHEET NOTE
[facility] - Outpatient Rehabilitation and Therapy: [address], [place] [address] office: [phone] fax: [phone]     Physical Therapy Initial Evaluation Certification      Dear Heriberto Bran MD ,    We had the pleasure of evaluating the following patient for physical therapy services at [facility] Outpatient Physical Therapy.  A summary of our findings can be found in the initial assessment below.  This includes our plan of care.  If you have any questions or concerns regarding these findings, please do not hesitate to contact me at the office phone number listed above.  Thank you for the referral.     Physician Signature:_______________________________Date:__________________  By signing above (or electronic signature), therapist’s plan is approved by physician       Physical Therapy: TREATMENT/PROGRESS NOTE   Patient: Nkechi Shukla (39 y.o. female)   Examination Date: 2025   :  1985 MRN: [id number]   Visit #:   Insurance Allowable Auth Needed   mn []Yes    [x]No    Insurance: Payor: [facility] MEDICARE / Plan: [email or web address] DUAL COMPLETE / Product Type: *No Product type* /   Insurance ID: [id number] - (Medicare Managed)  Secondary Insurance (if applicable): [employer]*   Treatment Diagnosis:     ICD-10-CM    1. Decreased functional activity tolerance  R68.89       2. Functional weakness  R53.1       3. Decreased ability to perform activities  R68.89       4. Need for home exercise program  Z78.9       5. Decreased exercise tolerance  R68.89          Medical Diagnosis:  Nontraumatic complete tear of right rotator cuff [M75.121]   Referring Physician: Heriberto Bran MD  PCP: Jose Noonan MD     Plan of care signed (Y/N):     Date of Patient follow up with Physician:      Plan of Care Report: NO  POC update due: (10 visits /OR AUTH LIMITS, whichever is less)  3/3/2025                                             Medical

## 2025-02-24 ENCOUNTER — OFFICE VISIT (OUTPATIENT)
Dept: ORTHOPEDIC SURGERY | Age: 40
End: 2025-02-24

## 2025-02-24 VITALS — HEIGHT: 59 IN | WEIGHT: 179 LBS | BODY MASS INDEX: 36.08 KG/M2

## 2025-02-24 DIAGNOSIS — M75.121 NONTRAUMATIC COMPLETE TEAR OF RIGHT ROTATOR CUFF: Primary | ICD-10-CM

## 2025-02-24 PROCEDURE — 99024 POSTOP FOLLOW-UP VISIT: CPT | Performed by: ORTHOPAEDIC SURGERY

## 2025-02-24 RX ORDER — IBUPROFEN 200 MG
200 TABLET ORAL EVERY 6 HOURS PRN
COMMUNITY

## 2025-02-24 NOTE — PROGRESS NOTES
Shoulder Arthroscopy Follow-up  Nkechi Shukla is here for follow up after right shoulder arthroscopic surgery. Surgery date was 1/10/25. Findings at surgery: Medium to large full-thickness rotator cuff tear. Pain is controlled with current analgesics.  Medication(s) being used: Percocet. The patient's pain is rated at 3/10.   She has been to PT at the [facility].  She is not wearing sling today.  She states that she stopped wearing it 4 days ago.  She does admit to abducting her shoulder in order to wash her armpit.      Physical Examination:  Ht 1.499 m (4' 11\")   Wt 81.2 kg (179 lb)   BMI 36.15 kg/m²    Patient is awake, alert, and in no acute distress.  The incisions are healed.      Assessment:   6 weeks status post right shoulder arthroscopy, subacromial decompression, rotator cuff repair with bioinductive implant augmentation       Plan:   Continue physical therapy.      The patient may advance their weight-bearing.    She can discontinue the sling at this point.    Refill pain medications as needed.    OTC NSAIDs as needed.    Ice as needed.    Follow-up in 2 months      Heriberto Bran MD  Orthopaedic Surgery and Sports Medicine     Disclaimer:  This note was generated with use of a verbal recognition program and an attempt was made to check for errors.  It is possible that there are still dictated errors within this office note.  If so, please bring any significant errors to my attention for an addendum.  All efforts were made to ensure that this office note is accurate.

## 2025-02-25 ENCOUNTER — HOSPITAL ENCOUNTER (OUTPATIENT)
Dept: PHYSICAL THERAPY | Age: 40
Setting detail: THERAPIES SERIES
Discharge: HOME | End: 2025-02-25
Attending: ORTHOPAEDIC SURGERY
Payer: MEDICARE

## 2025-02-25 PROCEDURE — 97140 MANUAL THERAPY 1/> REGIONS: CPT | Performed by: PHYSICAL THERAPIST

## 2025-02-25 PROCEDURE — 97110 THERAPEUTIC EXERCISES: CPT | Performed by: PHYSICAL THERAPIST

## 2025-02-25 NOTE — FLOWSHEET NOTE
happy that I had my sling off, I've been using it a little        Test used Initial score  1/29/25 02/25/2025   Pain Summary VAS 0-5 1/10   Functional questionnaire Upper Extremity functional Scale 0    Other:              Pain:  Pain location: right shoulder   Patient describes pain to be constant, Sharp, aching, burning, squeezing, and throbbing  Pain decreases with: Resting and Ice  Pain increases with: Activity and Movement, Prolonged sitting, Driving, Stretching, Reaching, Pushing, Throwing, and Impact     Living status: ind    Occupation/School:  Work/School Status: Disabled  Job Duties/Demands: NA    Hand Dominance: Right    Sport/ Recreation/ Leisure/ Hobbies: none    Review Of Systems (ROS):  [x] Performed Review of systems (Integumentary, CardioPulmonary, Neurological) by intake and observation. Intake form has been scanned into medical record. Patient has been instructed to contact their primary care physician regarding ROS issues if not already being addressed at this time.    [x] Patient history, allergies, meds reviewed. Medical chart reviewed. See intake form.     OBJECTIVE EXAMINATION     1/29/25  ROM/Strength: (Blank cells denote NT)    Mvmt (norm) AROM L AROM R Notes PROM L PROM R Notes     CERVICAL Flex (60) wfl       Ext (70)        SB(45)          Rotation (80)             SHOULDER Flexion (180)     90     Abduction (180)     70     ER -0     -5     ER -90 (90)          IR -0     50     IR -90 (70)           ELBOW Flex/biceps (140)     wfl     Ext/triceps (0)     -10     Pronation (80)          Supination (80)     -15        WRIST Flexion (60)          Extension (60)          RD (20)          UD (20)                         MMT L MMT R Notes     CERVICAL Cerv flexion       Cerv extension       Cerv SB       Cerv rotation          SHOULDER Flexion  1     Abduction  1     ER -0  1     ER -90  1     IR -0  1     IR -90  1      ELBOW Flex/biceps  4     Ext/triceps  4     Pronation  4

## 2025-02-27 ENCOUNTER — HOSPITAL ENCOUNTER (OUTPATIENT)
Dept: PHYSICAL THERAPY | Age: 40
Setting detail: THERAPIES SERIES
Discharge: HOME | End: 2025-02-27
Attending: ORTHOPAEDIC SURGERY
Payer: MEDICARE

## 2025-02-27 PROCEDURE — 97140 MANUAL THERAPY 1/> REGIONS: CPT | Performed by: PHYSICAL THERAPIST

## 2025-02-27 PROCEDURE — 97110 THERAPEUTIC EXERCISES: CPT | Performed by: PHYSICAL THERAPIST

## 2025-02-27 NOTE — FLOWSHEET NOTE
[facility] - Outpatient Rehabilitation and Therapy: [address], [place] [address] office: [phone] fax: [phone]     Physical Therapy Initial Evaluation Certification      Dear Heriberto Bran MD ,    We had the pleasure of evaluating the following patient for physical therapy services at [facility] Out[facility] Physical Therapy.  A summary of our findings can be found in the initial assessment below.  This includes our plan of care.  If you have any questions or concerns regarding these findings, please do not hesitate to contact me at the office phone number listed above.  Thank you for the referral.     Physician Signature:_______________________________Date:__________________  By signing above (or electronic signature), therapist’s plan is approved by physician       Physical Therapy: TREATMENT/PROGRESS NOTE   Patient: Nkechi Shukla (39 y.o. female)   Examination Date: 2025   :  1985 MRN: [id number]   Visit #:   Insurance Allowable Auth Needed   mn []Yes    [x]No    Insurance: Payor: [facility] MEDICARE / Plan: [employer] DUAL COMPLETE / Product Type: *No Product type* /   Insurance ID: [id number] - (Medicare Managed)  Secondary Insurance (if applicable): [employer]   Treatment Diagnosis:     ICD-10-CM    1. Decreased functional activity tolerance  R68.89       2. Functional weakness  R53.1       3. Decreased ability to perform activities  R68.89       4. Need for home exercise program  Z78.9       5. Decreased exercise tolerance  R68.89          Medical Diagnosis:  Nontraumatic complete tear of right rotator cuff [M75.121]   Referring Physician: Heriberto Bran MD  PCP: Jose Noonan MD     Plan of care signed (Y/N):     Date of Patient follow up with Physician:      Plan of Care Report: NO  POC update due: (10 visits /OR AUTH LIMITS, whichever is less)  3/3/2025                                             Medical

## 2025-03-04 ENCOUNTER — HOSPITAL ENCOUNTER (OUTPATIENT)
Dept: PHYSICAL THERAPY | Age: 40
Setting detail: THERAPIES SERIES
Discharge: HOME | End: 2025-03-04
Attending: ORTHOPAEDIC SURGERY
Payer: MEDICARE

## 2025-03-04 PROCEDURE — 97140 MANUAL THERAPY 1/> REGIONS: CPT | Performed by: PHYSICAL THERAPIST

## 2025-03-04 PROCEDURE — 97110 THERAPEUTIC EXERCISES: CPT | Performed by: PHYSICAL THERAPIST

## 2025-03-04 NOTE — FLOWSHEET NOTE
SB       Cerv rotation          SHOULDER Flexion  1     Abduction  1     ER -0  1     ER -90  1     IR -0  1     IR -90  1      ELBOW Flex/biceps  4     Ext/triceps  4     Pronation  4     supination  4        WRIST Flexion  4     Extension  4     RD  4     UD  4       4            Special Tests:  [] None Assessed   [] Following tests noted:    NT    Balance:  [x] WNL      [] NT       [] Dysfunction noted  Comment:     Falls Risk Assessment (30 days):   Falls Risk assessed and no intervention required.  Time Up and Go (TUG):   Not Assessed        Exercises/Interventions     Therapeutic Ex (68419)  resistance Notes/Cues/Progressions   HEP Initiated 1/30/25, reviewed 2/4/25, added 2/25/25 DOS 1/10/25   Overhead pulleys X 4 min    Railing slide X 3 min    Cane flex/press Cane press supine X 20    Pecs stretch 30 x 3    Table slides- flex/scapt 2 min each    Hand, wrist, elbow motions X 30 ea    shrugs X 2 min 3 wks  1/31/25   scap retraction  X 2 min    gripper bluex 30 6 wks 2/21/25   codmans X 4 min  8 wks 3/7/25   Sl scap clock   10 wks  3/21/25   Sl thor rot X  2 min    Sl ranger          Manual Intervention (56811)     Gentle mfr to shoulder, cerv, traps, biceps, wrist flexors prom per protocol, cervical sideglides, cerv rot    PROM R shoulder within protocol, bicep stretching, STM to R wrist flexors/extensors, prone thoracic mobs gr 3 15 min                   NMR re-education (18431) resistance CUES NEEDED   [place] ball roll X 2 min                                                      Therapeutic Activity (13346)     Discussed mechanism of injury, anatomy, physiology, biomechanics, sleeping positions,  and strategies to accelerate the healing process. Answered all of patients questions regarding injury.  Gave necessary information to get any equipment needed.                             Modalities:    No modalities applied this session    Education/Home Exercise Program:   Access Code: [email or web address]  URL:

## 2025-03-06 ENCOUNTER — HOSPITAL ENCOUNTER (OUTPATIENT)
Dept: PHYSICAL THERAPY | Age: 40
Setting detail: THERAPIES SERIES
Discharge: HOME | End: 2025-03-06
Attending: ORTHOPAEDIC SURGERY
Payer: MEDICARE

## 2025-03-06 PROCEDURE — 97110 THERAPEUTIC EXERCISES: CPT | Performed by: PHYSICAL THERAPIST

## 2025-03-06 PROCEDURE — 97140 MANUAL THERAPY 1/> REGIONS: CPT | Performed by: PHYSICAL THERAPIST

## 2025-03-06 NOTE — FLOWSHEET NOTE
THERAPY -  Manual therapy techniques, 1 or more regions, each 15 minutes (Mobilization/manipulation, manual lymphatic drainage, manual traction) for the purpose of modulating pain, promoting relaxation,  increasing ROM, reducing/eliminating soft tissue swelling/inflammation/restriction, improving soft tissue extensibility and allowing for proper ROM for normal function with self care, mobility, lifting and ambulation    GOALS     Patient stated goal: \" I want to be able to use my arm like normal \"  [x] Progressing: [] Met: [] Not Met: [] Adjusted    Therapist goals for Patient:   Short Term Goals: To be achieved in:4 weeks  1Independent in HEP and progression per patient tolerance, in order to prevent re-injury.   [x] Progressing: [] Met: [] Not Met: [] Adjusted  Patient will have a decrease in pain to <2/10 to facilitate improvement in movement, function, and ADLs as indicated by Functional Deficits.  [x] Progressing: [] Met: [] Not Met: [] Adjusted    Long Term Goals: To be achieved in: 24 weeks  Disability index score of 50 or less for the Upper Extremity functional Scale to assist with reaching prior level of function with activities such as adl's.  [] Progressing: [] Met: [] Not Met: [] Adjusted  Patient will demonstrate increased AROM of right shoulder to 90% normal  without pain to allow for proper joint functioning to enable patient to adl's.   [x] Progressing: [] Met: [] Not Met: [] Adjusted  Patient will demonstrate increased Strength of right ue  to at least 4+/5 throughout without pain to allow for proper functional mobility to enable patient to return to overhead activities.   [] Progressing: [] Met: [] Not Met: [] Adjusted  Patient will return to raising her arm overhead  without increased symptoms or restriction.   [] Progressing: [] Met: [] Not Met: [] Adjusted  \" I want to be able to do my work around the house   [] Progressing: [] Met: [] Not Met: [] Adjusted         Overall Progression Towards

## 2025-03-11 ENCOUNTER — HOSPITAL ENCOUNTER (OUTPATIENT)
Dept: PHYSICAL THERAPY | Age: 40
Setting detail: THERAPIES SERIES
Discharge: HOME | End: 2025-03-11
Attending: ORTHOPAEDIC SURGERY
Payer: MEDICARE

## 2025-03-11 PROCEDURE — 97140 MANUAL THERAPY 1/> REGIONS: CPT | Performed by: PHYSICAL THERAPIST

## 2025-03-11 PROCEDURE — 97110 THERAPEUTIC EXERCISES: CPT | Performed by: PHYSICAL THERAPIST

## 2025-03-12 ENCOUNTER — OFFICE VISIT (OUTPATIENT)
Dept: ORTHOPEDIC SURGERY | Age: 40
End: 2025-03-12

## 2025-03-12 VITALS — WEIGHT: 180.2 LBS | HEIGHT: 59 IN | BODY MASS INDEX: 36.33 KG/M2

## 2025-03-12 DIAGNOSIS — M17.12 ARTHRITIS OF LEFT KNEE: Primary | ICD-10-CM

## 2025-03-12 RX ORDER — TRIAMCINOLONE ACETONIDE 40 MG/ML
40 INJECTION, SUSPENSION INTRA-ARTICULAR; INTRAMUSCULAR ONCE
Status: COMPLETED | OUTPATIENT
Start: 2025-03-12 | End: 2025-03-12

## 2025-03-12 RX ORDER — BUPIVACAINE HYDROCHLORIDE 2.5 MG/ML
2 INJECTION, SOLUTION INFILTRATION; PERINEURAL ONCE
Status: COMPLETED | OUTPATIENT
Start: 2025-03-12 | End: 2025-03-12

## 2025-03-12 RX ADMIN — TRIAMCINOLONE ACETONIDE 40 MG: 40 INJECTION, SUSPENSION INTRA-ARTICULAR; INTRAMUSCULAR at 10:11

## 2025-03-12 RX ADMIN — BUPIVACAINE HYDROCHLORIDE 5 MG: 2.5 INJECTION, SOLUTION INFILTRATION; PERINEURAL at 10:11

## 2025-03-13 ENCOUNTER — HOSPITAL ENCOUNTER (OUTPATIENT)
Dept: PHYSICAL THERAPY | Age: 40
Setting detail: THERAPIES SERIES
Discharge: HOME | End: 2025-03-13
Attending: ORTHOPAEDIC SURGERY
Payer: MEDICARE

## 2025-03-13 PROCEDURE — 97110 THERAPEUTIC EXERCISES: CPT | Performed by: PHYSICAL THERAPIST

## 2025-03-13 PROCEDURE — 97140 MANUAL THERAPY 1/> REGIONS: CPT | Performed by: PHYSICAL THERAPIST

## 2025-03-13 NOTE — FLOWSHEET NOTE
[facility] - Outpatient Rehabilitation and Therapy: [address], [place] [address] office: [phone] fax: [phone]     Physical Therapy Initial Evaluation Certification      Dear Heriberto Bran MD ,    We had the pleasure of evaluating the following patient for physical therapy services at [facility] Outpatient Physical Therapy.  A summary of our findings can be found in the initial assessment below.  This includes our plan of care.  If you have any questions or concerns regarding these findings, please do not hesitate to contact me at the office phone number listed above.  Thank you for the referral.     Physician Signature:_______________________________Date:__________________  By signing above (or electronic signature), therapist’s plan is approved by physician       Physical Therapy: TREATMENT/PROGRESS NOTE   Patient: Nkechi Shukla (39 y.o. female)   Examination Date: 2025   :  1985 MRN: [id number]   Visit #:   Insurance Allowable Auth Needed   mn []Yes    [x]No    Insurance: Payor: [facility] MEDICARE / Plan: [employer] DUAL COMPLETE / Product Type: *No Product type* /   Insurance ID: [id number] - (Medicare Managed)  Secondary Insurance (if applicable): MEDICAID [place]   Treatment Diagnosis:     ICD-10-CM    1. Decreased functional activity tolerance  R68.89       2. Functional weakness  R53.1       3. Decreased ability to perform activities  R68.89       4. Need for home exercise program  Z78.9       5. Decreased exercise tolerance  R68.89          Medical Diagnosis:  Nontraumatic complete tear of right rotator cuff [M75.121]   Referring Physician: Heriberto Bran MD  PCP: Jose Noonan MD     Plan of care signed (Y/N):     Date of Patient follow up with Physician:      Plan of Care Report: YES, Date Range for this report: 25-3/4/25 to    POC update due: (10 visits /OR AUTH LIMITS, whichever is less)  3/3/2025

## 2025-03-25 ENCOUNTER — HOSPITAL ENCOUNTER (OUTPATIENT)
Dept: PHYSICAL THERAPY | Age: 40
Setting detail: THERAPIES SERIES
Discharge: HOME | End: 2025-03-25
Attending: ORTHOPAEDIC SURGERY
Payer: MEDICARE

## 2025-03-25 PROCEDURE — 97110 THERAPEUTIC EXERCISES: CPT | Performed by: PHYSICAL THERAPIST

## 2025-03-25 PROCEDURE — 97140 MANUAL THERAPY 1/> REGIONS: CPT | Performed by: PHYSICAL THERAPIST

## 2025-03-25 NOTE — FLOWSHEET NOTE
[facility] - Outpatient Rehabilitation and Therapy: [address], [place] [address] office: [phone] fax: [phone]     Physical Therapy Initial Evaluation Certification      Dear Heriberto Bran MD ,    We had the pleasure of evaluating the following patient for physical therapy services at [facility] Outpatient Physical Therapy.  A summary of our findings can be found in the initial assessment below.  This includes our plan of care.  If you have any questions or concerns regarding these findings, please do not hesitate to contact me at the office phone number listed above.  Thank you for the referral.     Physician Signature:_______________________________Date:__________________  By signing above (or electronic signature), therapist’s plan is approved by physician       Physical Therapy: TREATMENT/PROGRESS NOTE   Patient: Nkechi Shukla (39 y.o. female)   Examination Date: 2025   :  1985 MRN: [id number]   Visit #:   Insurance Allowable Auth Needed   mn []Yes    [x]No    Insurance: Payor: [facility] MEDICARE / Plan: [employer] DUAL COMPLETE / Product Type: *No Product type* /   Insurance ID: [id number] - (Medicare Managed)  Secondary Insurance (if applicable): MEDICAID [place]   Treatment Diagnosis:     ICD-10-CM    1. Decreased functional activity tolerance  R68.89       2. Functional weakness  R53.1       3. Decreased ability to perform activities  R68.89       4. Need for home exercise program  Z78.9       5. Decreased exercise tolerance  R68.89          Medical Diagnosis:  Nontraumatic complete tear of right rotator cuff [M75.121]   Referring Physician: Heriberto Bran MD  PCP: Jose Noonan MD     Plan of care signed (Y/N):     Date of Patient follow up with Physician:      Plan of Care Report: YES, Date Range for this report: 25-3/4/25 to    POC update due: (10 visits /OR AUTH LIMITS, whichever is less)  3/3/2025

## 2025-03-27 ENCOUNTER — HOSPITAL ENCOUNTER (OUTPATIENT)
Dept: PHYSICAL THERAPY | Age: 40
Setting detail: THERAPIES SERIES
Discharge: HOME | End: 2025-03-27
Attending: ORTHOPAEDIC SURGERY
Payer: MEDICARE

## 2025-03-27 PROCEDURE — 97140 MANUAL THERAPY 1/> REGIONS: CPT | Performed by: PHYSICAL THERAPIST

## 2025-03-27 PROCEDURE — 97110 THERAPEUTIC EXERCISES: CPT | Performed by: PHYSICAL THERAPIST

## 2025-03-27 NOTE — FLOWSHEET NOTE
postural re-education.    Manual Therapy (78519) as indicated to include: Passive Range of Motion, Gr I-IV mobilizations, Soft Tissue Mobilization, Trigger Point Release, and Myofascial Release  Modalities as needed that may include: Vasoneumatic Compression  Patient education on joint protection, postural re-education, activity modification, and progression of HEP    Plan: POC initiated as per evaluationUE arom, aarom, prom, strengthening, scapular strength, myofascial release, joint mobs to gh, sc, ac, scapular thoracic, modalities for pain, hep.  Follow Dr. Bran's protocol  2/4/25  Progress with protocol. Try gentle sl scap mobs  2/11/25  Cont with protocol, give sefl er stretch next rx  2/13/25  Will wait to 6 weeks to give self er.  Work on right cerv rot next rx.  She is lacking right cerv rot  2/18/25  We should be able to increase exs in a week.  She see's md on the 24th.  Will wait until then to increase protocol.    2/20/25  Will initiate week 6 aarom exs after she see's the md next week   2/25/25  Progress with week 6 exs on protocol.   2/27/25  Work on thoracic ext  3/4/25  Continue to increase aarom exs  3/6/25  Begin 8 week  protocol exs, arom supine  3/11/25  Start some supine and sl arom  3/13/25  Start behind the back stretch  3/25/25  Start ube no resistance and gentle isos.   3/27/25  Re eval and send poc next rx       Electronically Signed by Anuj Armendariz, PT  Date: 03/27/2025     Note: Portions of this note have been templated and/or copied from initial evaluation, reassessments and prior notes for documentation efficiency.    Note: If patient does not return for scheduled/recommended follow up visits, this note will serve as a discharge from care along with the most recent update on progress.    Ortho Evaluation

## 2025-04-01 ENCOUNTER — HOSPITAL ENCOUNTER (OUTPATIENT)
Dept: PHYSICAL THERAPY | Age: 40
Setting detail: THERAPIES SERIES
Discharge: HOME | End: 2025-04-01
Attending: ORTHOPAEDIC SURGERY
Payer: MEDICARE

## 2025-04-01 ENCOUNTER — OFFICE VISIT (OUTPATIENT)
Dept: ORTHOPEDIC SURGERY | Age: 40
End: 2025-04-01
Payer: MEDICARE

## 2025-04-01 VITALS — WEIGHT: 180 LBS | BODY MASS INDEX: 36.29 KG/M2 | HEIGHT: 59 IN

## 2025-04-01 DIAGNOSIS — M25.562 LEFT KNEE PAIN, UNSPECIFIED CHRONICITY: Primary | ICD-10-CM

## 2025-04-01 DIAGNOSIS — S83.242A ACUTE MEDIAL MENISCUS TEAR OF LEFT KNEE, INITIAL ENCOUNTER: ICD-10-CM

## 2025-04-01 PROCEDURE — G8417 CALC BMI ABV UP PARAM F/U: HCPCS | Performed by: PHYSICIAN ASSISTANT

## 2025-04-01 PROCEDURE — 97110 THERAPEUTIC EXERCISES: CPT | Performed by: PHYSICAL THERAPIST

## 2025-04-01 PROCEDURE — 1036F TOBACCO NON-USER: CPT | Performed by: PHYSICIAN ASSISTANT

## 2025-04-01 PROCEDURE — 99213 OFFICE O/P EST LOW 20 MIN: CPT | Performed by: PHYSICIAN ASSISTANT

## 2025-04-01 PROCEDURE — G8427 DOCREV CUR MEDS BY ELIG CLIN: HCPCS | Performed by: PHYSICIAN ASSISTANT

## 2025-04-01 PROCEDURE — 97140 MANUAL THERAPY 1/> REGIONS: CPT | Performed by: PHYSICAL THERAPIST

## 2025-04-01 NOTE — FLOWSHEET NOTE
[facility] - Outpatient Rehabilitation and Therapy: [address], [place] [address] office: [phone] fax: [phone]         Physical Therapy Re-Certification Plan of Care/MD UPDATE      Dear Heriberto Bran MD,    We had the pleasure of treating the following patient for physical therapy services at [facility].  A summary of our findings can be found in the updated assessment below.  This includes our plan of care.  If you have any questions or concerns regarding these findings, please do not hesitate to contact me at the office phone number checked above.  Thank you for the referral.     Physician Signature:________________________________Date:__________________  By signing above (or electronic signature), therapist’s plan is approved by physician    Date Range Of Visits: 1/30-4/1/25  Total Visits to Date: 16  Overall Response to Treatment:   [x]Patient is responding well to treatment and improvement is noted with regards  to goals   []Patient should continue to improve in reasonable time if they continue HEP   []Patient has plateaued and is no longer responding to skilled PT intervention    []Patient is getting worse and would benefit from return to referring MD   []Patient unable to adhere to initial POC   []Other:       Recommendation:    [x]Continue PT 2x / wk for 8 weeks.    []Hold PT, pending MD visit          Physical Therapy Initial Evaluation Certification      Dear Heriberto Bran MD ,    We had the pleasure of evaluating the following patient for physical therapy services at [facility] Out[facility] Physical Therapy.  A summary of our findings can be found in the initial assessment below.  This includes our plan of care.  If you have any questions or concerns regarding these findings, please do not hesitate to contact me at the office phone number listed above.  Thank you for the referral.     Physician

## 2025-04-01 NOTE — PROGRESS NOTES
Subjective:      Patient ID: Nkechi Shukla is a 39 y.o. female who is here for follow up evaluation of left knee pain.  She underwent a left knee intra-articular steroid injection 3/12/2025 for the treatment of mild left knee osteoarthritis.  She reports minimal relief of her left knee pain with that injection.  She complains of a significant amount of discomfort over the medial aspect of the left knee.  She reports catching and swelling as well.      Review Of Systems:   Constitutional: denies fever, chills, weight loss.  MSK: denies pain in other joints, muscle aches.  Neurological: denies numbness, tingling, weakness.       Past Medical History:   Diagnosis Date    AIDP (acute inflammatory demyelinating polyneuropathy) 06/2020    [place] [facility]    Anesthesia complication     after colonoscopy-was lightheaded    Anxiety     COVID     Depression     Guillain Barré syndrome     Heartburn     HTN (hypertension)     Wears contact lenses     Wears glasses        Family History   Problem Relation Age of Onset    Hypertension Mother     Hypertension Father     Coronary Art Dis Father     High Blood Pressure Sister        Past Surgical History:   Procedure Laterality Date    CHOLECYSTECTOMY, LAPAROSCOPIC N/A 03/15/2021    [facility]    COLONOSCOPY  12/01/2017    [facility]- Normal    ENDOMETRIAL ABLATION      HERNIA REPAIR      LIPOMA RESECTION      Forehead    SHOULDER ARTHROSCOPY Right 1/10/2025    RIGHT SHOULDER ARTHROSCOPY SUBACROMIAL DECOMPRESSION performed by Heriberto Bran MD at [facility] OR    SHOULDER ARTHROSCOPY Right 1/10/2025    ROTATOR CUFF REPAIR POSSIBLE BIO INDUCTIVE IMPLANT AUGMENTATION performed by Heriberto Bran MD at [facility] OR    UMBILICAL HERNIA REPAIR  12/13/2017    Gabriel- with mesh       Social History     Occupational History    Not on file   Tobacco Use    Smoking status: Never    Smokeless tobacco: Never   Vaping Use    Vaping status: Never Used   Substance and Sexual Activity    Alcohol use: Never

## 2025-04-07 ENCOUNTER — OFFICE VISIT (OUTPATIENT)
Dept: ORTHOPEDIC SURGERY | Age: 40
End: 2025-04-07
Payer: MEDICARE

## 2025-04-07 VITALS — WEIGHT: 178 LBS | HEIGHT: 59 IN | BODY MASS INDEX: 35.88 KG/M2

## 2025-04-07 DIAGNOSIS — M75.121 NONTRAUMATIC COMPLETE TEAR OF RIGHT ROTATOR CUFF: Primary | ICD-10-CM

## 2025-04-07 PROCEDURE — 99213 OFFICE O/P EST LOW 20 MIN: CPT | Performed by: ORTHOPAEDIC SURGERY

## 2025-04-07 PROCEDURE — G8417 CALC BMI ABV UP PARAM F/U: HCPCS | Performed by: ORTHOPAEDIC SURGERY

## 2025-04-07 PROCEDURE — G8427 DOCREV CUR MEDS BY ELIG CLIN: HCPCS | Performed by: ORTHOPAEDIC SURGERY

## 2025-04-07 PROCEDURE — 1036F TOBACCO NON-USER: CPT | Performed by: ORTHOPAEDIC SURGERY

## 2025-04-07 NOTE — PROGRESS NOTES
History:  Nkechi Shukla is here for follow up after right shoulder arthroscopic surgery. Surgery date was 1/10/25. Findings at surgery: Medium to large full-thickness rotator cuff tear. Pain is controlled with current analgesics.  Medication(s) being used: Percocet. The patient's pain is rated at 0/10.   She has been to PT at the [facility].       Physical Examination:  There were no vitals taken for this visit.   Patient is awake, alert, and in no acute distress.  Right shoulder active forward flexion 160, passive 160, ER 30 vs 45 on left, IR L4         Assessment:   3-1/2 months status post right shoulder arthroscopy, subacromial decompression, rotator cuff repair with bioinductive implant augmentation       Plan:   Continue physical therapy.      OTC NSAIDs as needed.    Ice as needed.    Follow-up in 2 months      Heriberto Bran MD  Orthopaedic Surgery and Sports Medicine     Disclaimer:  This note was generated with use of a verbal recognition program and an attempt was made to check for errors.  It is possible that there are still dictated errors within this office note.  If so, please bring any significant errors to my attention for an addendum.  All efforts were made to ensure that this office note is accurate.

## 2025-04-08 ENCOUNTER — HOSPITAL ENCOUNTER (OUTPATIENT)
Dept: PHYSICAL THERAPY | Age: 40
Setting detail: THERAPIES SERIES
Discharge: HOME | End: 2025-04-08
Attending: ORTHOPAEDIC SURGERY
Payer: MEDICARE

## 2025-04-08 PROCEDURE — 97110 THERAPEUTIC EXERCISES: CPT | Performed by: PHYSICAL THERAPIST

## 2025-04-08 PROCEDURE — 97112 NEUROMUSCULAR REEDUCATION: CPT | Performed by: PHYSICAL THERAPIST

## 2025-04-08 PROCEDURE — 97140 MANUAL THERAPY 1/> REGIONS: CPT | Performed by: PHYSICAL THERAPIST

## 2025-04-08 NOTE — FLOWSHEET NOTE
[facility] - Outpatient Rehabilitation and Therapy: [address], [address], [place] [address] office: [phone] fax: [phone]               Physical Therapy: TREATMENT/PROGRESS NOTE   Patient: Nkechi Shukla (39 y.o. female)   Examination Date: 2025   :  1985 MRN: [id number]   Visit #:   Insurance Allowable Auth Needed   mn []Yes    [x]No    Insurance: Payor: [facility] MEDICARE / Plan: [employer] DUAL COMPLETE / Product Type: *No Product type* /   Insurance ID: [id number] - (Medicare Managed)  Secondary Insurance (if applicable): MEDICAID [place]   Treatment Diagnosis:     ICD-10-CM    1. Decreased functional activity tolerance  R68.89       2. Functional weakness  R53.1       3. Decreased ability to perform activities  R68.89       4. Need for home exercise program  Z78.9       5. Decreased exercise tolerance  R68.89          Medical Diagnosis:  Nontraumatic complete tear of right rotator cuff [M75.121]   Referring Physician: Heriberto Bran MD  PCP: Jose Noonan MD     Plan of care signed (Y/N): routed    Date of Patient follow up with Physician:      Plan of Care Report: YES, Date Range for this report: 25-3/4/25 to    POC update due: (10 visits /OR AUTH LIMITS, whichever is less)  3/3/2025                                             Medical History:  Medical History    Diagnosis Date Comment Source   HTN (hypertension)         Other Medical History    Diagnosis Date Comment Source   AIDP (acute inflammatory demyelinating polyneuropathy) ([facility]) 2020 Admit to [place]    Anesthesia complication  after colonoscopy-was lightheaded    Anxiety      COVID      Depression      Guillain Barré syndrome (HCC)      Heartburn      Wears contact lenses      Wears glasses                                                  Precautions/ Contra-indications:           Latex allergy:  NO  Pacemaker:    NO  Contraindications for Manipulation: None and NA  Date of Surgery:

## 2025-04-09 DIAGNOSIS — H91.90 HEARING LOSS, UNSPECIFIED HEARING LOSS TYPE, UNSPECIFIED LATERALITY: Primary | ICD-10-CM

## 2025-04-10 ENCOUNTER — HOSPITAL ENCOUNTER (OUTPATIENT)
Dept: PHYSICAL THERAPY | Age: 40
Setting detail: THERAPIES SERIES
Discharge: HOME | End: 2025-04-10
Attending: ORTHOPAEDIC SURGERY
Payer: MEDICARE

## 2025-04-10 PROCEDURE — 97140 MANUAL THERAPY 1/> REGIONS: CPT | Performed by: PHYSICAL THERAPIST

## 2025-04-10 PROCEDURE — 97112 NEUROMUSCULAR REEDUCATION: CPT | Performed by: PHYSICAL THERAPIST

## 2025-04-10 PROCEDURE — 97110 THERAPEUTIC EXERCISES: CPT | Performed by: PHYSICAL THERAPIST

## 2025-04-10 NOTE — FLOWSHEET NOTE
[] Progression is slowed due to complexities/Impairments listed.  [] Progression has been slowed due to co-morbidities.  [] Plan just implemented, too soon (<30days) to assess goals progression   [] Goals require adjustment due to lack of progress  [] Patient is not progressing as expected and requires additional follow up with physician  [] Other:     TREATMENT PLAN     Frequency/Duration: 2-3x/week for  24  weeks for the following treatment interventions:    Interventions:  Therapeutic Exercise (50643) including: strength training, ROM, and functional mobility  Therapeutic Activities (14952) including: functional mobility training and education.  Neuromuscular Re-education (94446) activation and proprioception, including postural re-education.    Manual Therapy (51995) as indicated to include: Passive Range of Motion, Gr I-IV mobilizations, Soft Tissue Mobilization, Trigger Point Release, and Myofascial Release  Modalities as needed that may include: Vasoneumatic Compression  Patient education on joint protection, postural re-education, activity modification, and progression of HEP    Plan: POC initiated as per evaluationUE arom, aarom, prom, strengthening, scapular strength, myofascial release, joint mobs to gh, sc, ac, scapular thoracic, modalities for pain, hep.  Follow Dr. Bran's protocol  2/4/25  Progress with protocol. Try gentle sl scap mobs  2/11/25  Cont with protocol, give sefl er stretch next rx  2/13/25  Will wait to 6 weeks to give self er.  Work on right cerv rot next rx.  She is lacking right cerv rot  2/18/25  We should be able to increase exs in a week.  She see's md on the 24th.  Will wait until then to increase protocol.    2/20/25  Will initiate week 6 aarom exs after she see's the md next week   2/25/25  Progress with week 6 exs on protocol.   2/27/25  Work on thoracic ext  3/4/25  Continue to increase aarom exs  3/6/25  Begin 8 week  protocol exs, arom supine  3/11/25  Start some supine and

## 2025-04-11 ENCOUNTER — TELEPHONE (OUTPATIENT)
Dept: ORTHOPEDIC SURGERY | Age: 40
End: 2025-04-11

## 2025-04-11 NOTE — TELEPHONE ENCOUNTER
----- Message from Anna GODFREY sent at 4/11/2025  1:55 PM EDT -----  Regarding: TEST RESULTS & CARE PLAN  Specialty Message to Provider    Relationship to Patient: Self     Patient Message: THE PT WOULD LIKE TO KNOW WHAT HER TEST RESULTS ARE AND WHAT MURALI MELOZER WANTS HER TO DO NEXT  --------------------------------------------------------------------------------------------------------------------------    Call Back Information: OK to leave message on voicemail  Preferred Call Back Number: Phone  [phone]

## 2025-04-12 NOTE — TELEPHONE ENCOUNTER
Called and discussed results of MRI  She will come into office Monday 10:30 to discuss knee scope.    Please place her on my schedule.

## 2025-04-14 ENCOUNTER — OFFICE VISIT (OUTPATIENT)
Dept: ORTHOPEDIC SURGERY | Age: 40
End: 2025-04-14
Payer: MEDICARE

## 2025-04-14 VITALS — WEIGHT: 180 LBS | HEIGHT: 59 IN | BODY MASS INDEX: 36.29 KG/M2

## 2025-04-14 DIAGNOSIS — S83.242D ACUTE MEDIAL MENISCUS TEAR OF LEFT KNEE, SUBSEQUENT ENCOUNTER: Primary | ICD-10-CM

## 2025-04-14 PROBLEM — S83.241A ACUTE MEDIAL MENISCUS TEAR OF RIGHT KNEE: Status: ACTIVE | Noted: 2025-04-14

## 2025-04-14 PROCEDURE — G8427 DOCREV CUR MEDS BY ELIG CLIN: HCPCS | Performed by: PHYSICIAN ASSISTANT

## 2025-04-14 PROCEDURE — G8417 CALC BMI ABV UP PARAM F/U: HCPCS | Performed by: PHYSICIAN ASSISTANT

## 2025-04-14 PROCEDURE — 99213 OFFICE O/P EST LOW 20 MIN: CPT | Performed by: PHYSICIAN ASSISTANT

## 2025-04-14 PROCEDURE — 1036F TOBACCO NON-USER: CPT | Performed by: PHYSICIAN ASSISTANT

## 2025-04-14 NOTE — PROGRESS NOTES
Subjective:      Patient ID: Nkechi Shukla is a 39 y.o. female who is here for follow up evaluation of her left knee.  She recently underwent MRI of the left knee.  She is here today to review test results.    She has a surgical history for right rotator cuff repair 1/10/2025.  She is currently in therapy for strengthening exercises.      Review Of Systems:   Constitutional: denies fever, chills, weight loss.  MSK: denies pain in other joints, muscle aches.  Neurological: denies numbness, tingling, weakness.       Past Medical History:   Diagnosis Date    AIDP (acute inflammatory demyelinating polyneuropathy) 06/2020    [facility] [facility]    Anesthesia complication     after colonoscopy-was lightheaded    Anxiety     COVID     Depression     Guillain Barré syndrome     Heartburn     HTN (hypertension)     Wears contact lenses     Wears glasses        Family History   Problem Relation Age of Onset    Hypertension Mother     Hypertension Father     Coronary Art Dis Father     High Blood Pressure Sister        Past Surgical History:   Procedure Laterality Date    CHOLECYSTECTOMY, LAPAROSCOPIC N/A 03/15/2021    [facility]    COLONOSCOPY  12/01/2017    [facility]- Normal    ENDOMETRIAL ABLATION      HERNIA REPAIR      LIPOMA RESECTION      Forehead    SHOULDER ARTHROSCOPY Right 1/10/2025    RIGHT SHOULDER ARTHROSCOPY SUBACROMIAL DECOMPRESSION performed by Heriberto Bran MD at [facility] OR    SHOULDER ARTHROSCOPY Right 1/10/2025    ROTATOR CUFF REPAIR POSSIBLE BIO INDUCTIVE IMPLANT AUGMENTATION performed by Heriberto Bran MD at [facility] OR    UMBILICAL HERNIA REPAIR  12/13/2017    Gabriel- with mesh       Social History     Occupational History    Not on file   Tobacco Use    Smoking status: Never    Smokeless tobacco: Never   Vaping Use    Vaping status: Never Used   Substance and Sexual Activity    Alcohol use: Never    Drug use: Never    Sexual activity: Yes       Current Outpatient Medications   Medication Sig Dispense Refill

## 2025-04-15 ENCOUNTER — HOSPITAL ENCOUNTER (OUTPATIENT)
Dept: PHYSICAL THERAPY | Age: 40
Setting detail: THERAPIES SERIES
Discharge: HOME | End: 2025-04-15
Attending: ORTHOPAEDIC SURGERY
Payer: MEDICARE

## 2025-04-15 PROCEDURE — 97110 THERAPEUTIC EXERCISES: CPT | Performed by: PHYSICAL THERAPIST

## 2025-04-15 PROCEDURE — 97140 MANUAL THERAPY 1/> REGIONS: CPT | Performed by: PHYSICAL THERAPIST

## 2025-04-15 PROCEDURE — 97112 NEUROMUSCULAR REEDUCATION: CPT | Performed by: PHYSICAL THERAPIST

## 2025-04-15 NOTE — FLOWSHEET NOTE
[facility] - Outpatient Rehabilitation and Therapy: [address], [address], [place] [address] office: [phone] fax: [phone]               Physical Therapy: TREATMENT/PROGRESS NOTE   Patient: Nkechi Shukla (39 y.o. female)   Examination Date: 04/15/2025   :  1985 MRN: [id number]   Visit #:   Insurance Allowable Auth Needed   mn []Yes    [x]No    Insurance: Payor: [facility] MEDICARE / Plan: [employer] DUAL COMPLETE / Product Type: *No Product type* /   Insurance ID: [id number] - (Medicare Managed)  Secondary Insurance (if applicable): MEDICAID [place]   Treatment Diagnosis:     ICD-10-CM    1. Decreased functional activity tolerance  R68.89       2. Functional weakness  R53.1       3. Decreased ability to perform activities  R68.89       4. Need for home exercise program  Z78.9       5. Decreased exercise tolerance  R68.89          Medical Diagnosis:  Nontraumatic complete tear of right rotator cuff [M75.121]   Referring Physician: Heriberto Bran MD  PCP: Jose Noonan MD     Plan of care signed (Y/N): routed    Date of Patient follow up with Physician:      Plan of Care Report: YES, Date Range for this report: 25-3/4/25 to    POC update due: (10 visits /OR AUTH LIMITS, whichever is less)  3/3/2025                                             Medical History:  Medical History    Diagnosis Date Comment Source   HTN (hypertension)         Other Medical History    Diagnosis Date Comment Source   AIDP (acute inflammatory demyelinating polyneuropathy) ([facility]) 2020 Admit to [place]    Anesthesia complication  after colonoscopy-was lightheaded    Anxiety      COVID      Depression      Guillain Barré syndrome (HCC)      Heartburn      Wears contact lenses      Wears glasses                                                  Precautions/ Contra-indications:           Latex allergy:  NO  Pacemaker:    NO  Contraindications for Manipulation: None and NA  Date of Surgery:

## 2025-04-17 ENCOUNTER — APPOINTMENT (OUTPATIENT)
Dept: PHYSICAL THERAPY | Age: 40
End: 2025-04-17
Attending: ORTHOPAEDIC SURGERY
Payer: MEDICARE

## 2025-04-21 ENCOUNTER — TELEPHONE (OUTPATIENT)
Dept: ORTHOPEDIC SURGERY | Age: 40
End: 2025-04-21

## 2025-04-21 NOTE — TELEPHONE ENCOUNTER
----- Message from Buffy MCCARTNEY sent at 4/21/2025 10:39 AM EDT -----  Regarding: Specialty Message to Provider  Specialty Message to Provider    Relationship to Patient: SELF    Patient Message: REQUESTING A CALL REGARDING HER KNEE.  --------------------------------------------------------------------------------------------------------------------------    Call Back Information:   Preferred Call Back Number: [phone]

## 2025-04-21 NOTE — TELEPHONE ENCOUNTER
In pain and meds don't work.   Can't walk down steps. Patient said that Loc said she would need surgery. Patient had shoulder surgery in January... scheduled patient to see Yina Wednesday to discuss a plan.

## 2025-04-22 ENCOUNTER — HOSPITAL ENCOUNTER (OUTPATIENT)
Dept: PHYSICAL THERAPY | Age: 40
Setting detail: THERAPIES SERIES
Discharge: HOME | End: 2025-04-22
Attending: ORTHOPAEDIC SURGERY
Payer: MEDICARE

## 2025-04-22 PROCEDURE — 97140 MANUAL THERAPY 1/> REGIONS: CPT | Performed by: PHYSICAL THERAPIST

## 2025-04-22 PROCEDURE — 97112 NEUROMUSCULAR REEDUCATION: CPT | Performed by: PHYSICAL THERAPIST

## 2025-04-22 PROCEDURE — 97110 THERAPEUTIC EXERCISES: CPT | Performed by: PHYSICAL THERAPIST

## 2025-04-22 NOTE — PLAN OF CARE
swelling/inflammation/restriction, improving soft tissue extensibility and allowing for proper ROM for normal function with self care, mobility, lifting and ambulation    GOALS     Patient stated goal: \" I want to be able to use my arm like normal \"  [x] Progressing: [] Met: [] Not Met: [] Adjusted    Therapist goals for Patient:   Short Term Goals: To be achieved in:4 weeks  1Independent in HEP and progression per patient tolerance, in order to prevent re-injury.   [] Progressing: [x] Met: [] Not Met: [] Adjusted  Patient will have a decrease in pain to <2/10 to facilitate improvement in movement, function, and ADLs as indicated by Functional Deficits.  [] Progressing: [x] Met: [] Not Met: [] Adjusted    Long Term Goals: To be achieved in: 24 weeks  Disability index score of 50 or less for the Upper Extremity functional Scale to assist with reaching prior level of function with activities such as adl's.  [] Progressing: [] Met: [] Not Met: [] Adjusted  Patient will demonstrate increased AROM of right shoulder to 90% normal  without pain to allow for proper joint functioning to enable patient to adl's.   [x] Progressing: [] Met: [] Not Met: [] Adjusted  Patient will demonstrate increased Strength of right ue  to at least 4+/5 throughout without pain to allow for proper functional mobility to enable patient to return to overhead activities.   [x] Progressing: [] Met: [] Not Met: [] Adjusted  Patient will return to raising her arm overhead  without increased symptoms or restriction.   [] Progressing: [] Met: [] Not Met: [] Adjusted  \" I want to be able to do my work around the house   [] Progressing: [] Met: [] Not Met: [] Adjusted         Overall Progression Towards Functional goals/ Treatment Progress Update:  [x] Patient is progressing as expected towards functional goals listed.    [] Progression is slowed due to complexities/Impairments listed.  [] Progression has been slowed due to co-morbidities.  [] Plan just

## 2025-04-23 ENCOUNTER — OFFICE VISIT (OUTPATIENT)
Dept: ORTHOPEDIC SURGERY | Age: 40
End: 2025-04-23
Payer: MEDICARE

## 2025-04-23 VITALS — BODY MASS INDEX: 36.29 KG/M2 | HEIGHT: 59 IN | WEIGHT: 180 LBS

## 2025-04-23 DIAGNOSIS — S83.242D ACUTE MEDIAL MENISCUS TEAR OF LEFT KNEE, SUBSEQUENT ENCOUNTER: Primary | ICD-10-CM

## 2025-04-23 DIAGNOSIS — M75.121 NONTRAUMATIC COMPLETE TEAR OF RIGHT ROTATOR CUFF: ICD-10-CM

## 2025-04-23 DIAGNOSIS — S83.242D ACUTE MEDIAL MENISCUS TEAR OF LEFT KNEE, SUBSEQUENT ENCOUNTER: ICD-10-CM

## 2025-04-23 LAB
BASOPHILS # BLD: 0.1 K/UL (ref 0–0.2)
BASOPHILS NFR BLD: 1.2 %
CRP SERPL-MCNC: 33.2 MG/L (ref 0–5.1)
DEPRECATED RDW RBC AUTO: 15 % (ref 12.4–15.4)
EOSINOPHIL # BLD: 0 K/UL (ref 0–0.6)
EOSINOPHIL NFR BLD: 0.5 %
ERYTHROCYTE [SEDIMENTATION RATE] IN BLOOD BY WESTERGREN METHOD: 56 MM/HR (ref 0–20)
HCT VFR BLD AUTO: 36.3 % (ref 36–48)
HGB BLD-MCNC: 12.3 G/DL (ref 12–16)
LYMPHOCYTES # BLD: 1.7 K/UL (ref 1–5.1)
LYMPHOCYTES NFR BLD: 17.6 %
MCH RBC QN AUTO: 28.7 PG (ref 26–34)
MCHC RBC AUTO-ENTMCNC: 33.9 G/DL (ref 31–36)
MCV RBC AUTO: 84.7 FL (ref 80–100)
MONOCYTES # BLD: 0.4 K/UL (ref 0–1.3)
MONOCYTES NFR BLD: 4.5 %
NEUTROPHILS # BLD: 7.2 K/UL (ref 1.7–7.7)
NEUTROPHILS NFR BLD: 76.2 %
PLATELET # BLD AUTO: 315 K/UL (ref 135–450)
PMV BLD AUTO: 7.7 FL (ref 5–10.5)
RBC # BLD AUTO: 4.29 M/UL (ref 4–5.2)
RHEUMATOID FACT SER IA-ACNC: <10 IU/ML
URATE SERPL-MCNC: 5.3 MG/DL (ref 2.6–6)
WBC # BLD AUTO: 9.5 K/UL (ref 4–11)

## 2025-04-23 PROCEDURE — 99213 OFFICE O/P EST LOW 20 MIN: CPT | Performed by: ORTHOPAEDIC SURGERY

## 2025-04-23 PROCEDURE — G8417 CALC BMI ABV UP PARAM F/U: HCPCS | Performed by: ORTHOPAEDIC SURGERY

## 2025-04-23 PROCEDURE — 1036F TOBACCO NON-USER: CPT | Performed by: ORTHOPAEDIC SURGERY

## 2025-04-23 PROCEDURE — G8427 DOCREV CUR MEDS BY ELIG CLIN: HCPCS | Performed by: ORTHOPAEDIC SURGERY

## 2025-04-23 NOTE — PROGRESS NOTES
CHIEF COMPLAINT: Left knee pain.    History:   Nkechi Shukla is a 39 y.o. female referred by ALTON Reyes for evaluation and treatment of left knee pain / injury.   She is 3 months postop right shoulder rotator cuff repair by me.  The patient complains of left knee pain.   This is evaluated as a personal injury.   The pain began 6-7 months ago.  There was not a history of injury.   Rates pain 7/10.   The pain is located medial and patellar.  Symptoms are worse with stairs  The knee has not given out or felt unstable. The patient cannot bend and straighten the knee fully. There is no swelling in the knee.   There was not catching / locking of the knee.   The patient has not had PT. The patient has had an injection without relief.   The patient has taken NSAIDs, ibuprofen with some relief. The patient has not tried ice.         Past Medical History:   Diagnosis Date    AIDP (acute inflammatory demyelinating polyneuropathy) 06/2020    Admit to [facility]    Anesthesia complication     after colonoscopy-was lightheaded    Anxiety     COVID     Depression     Guillain Barré syndrome     Heartburn     HTN (hypertension)     Wears contact lenses     Wears glasses        Past Surgical History:   Procedure Laterality Date    CHOLECYSTECTOMY, LAPAROSCOPIC N/A 03/15/2021    [facility]    COLONOSCOPY  12/01/2017    [facility]- Normal    ENDOMETRIAL ABLATION      HERNIA REPAIR      LIPOMA RESECTION      Forehead    SHOULDER ARTHROSCOPY Right 1/10/2025    RIGHT SHOULDER ARTHROSCOPY SUBACROMIAL DECOMPRESSION performed by Heriberto Bran MD at [facility] OR    SHOULDER ARTHROSCOPY Right 1/10/2025    ROTATOR CUFF REPAIR POSSIBLE BIO INDUCTIVE IMPLANT AUGMENTATION performed by Heriberto Bran MD at [facility] OR    UMBILICAL HERNIA REPAIR  12/13/2017    [facility]- with mesh       Family History   Problem Relation Age of Onset    Hypertension Mother     Hypertension Father     Coronary Art Dis Father     High Blood Pressure Sister

## 2025-04-24 ENCOUNTER — HOSPITAL ENCOUNTER (OUTPATIENT)
Dept: PHYSICAL THERAPY | Age: 40
Setting detail: THERAPIES SERIES
End: 2025-04-24
Attending: ORTHOPAEDIC SURGERY
Payer: MEDICARE

## 2025-04-24 LAB — ANA SER QL IA: NEGATIVE

## 2025-04-29 ENCOUNTER — HOSPITAL ENCOUNTER (OUTPATIENT)
Dept: PHYSICAL THERAPY | Age: 40
Setting detail: THERAPIES SERIES
Discharge: HOME | End: 2025-04-29
Attending: ORTHOPAEDIC SURGERY
Payer: MEDICARE

## 2025-04-29 PROCEDURE — 97140 MANUAL THERAPY 1/> REGIONS: CPT | Performed by: PHYSICAL THERAPIST

## 2025-04-29 PROCEDURE — 97110 THERAPEUTIC EXERCISES: CPT | Performed by: PHYSICAL THERAPIST

## 2025-04-29 PROCEDURE — 97112 NEUROMUSCULAR REEDUCATION: CPT | Performed by: PHYSICAL THERAPIST

## 2025-04-29 NOTE — FLOWSHEET NOTE
[facility] - Outpatient Rehabilitation and Therapy: [address], [address], [place] [address] office: [phone] fax: [phone]         Physical Therapy Re-Certification Plan of Care/MD UPDATE                      Physical Therapy: TREATMENT/PROGRESS NOTE   Patient: Nkechi Shukla (39 y.o. female)   Examination Date: 2025   :  1985 MRN: [id number]   Visit #:   Insurance Allowable Auth Needed   mn []Yes    [x]No    Insurance: Payor: [facility] MEDICARE / Plan: [employer] DUAL COMPLETE / Product Type: *No Product type* /   Insurance ID: [id number] - (Medicare Managed)  Secondary Insurance (if applicable): MEDICAID [place]   Treatment Diagnosis:     ICD-10-CM    1. Decreased functional activity tolerance  R68.89       2. Functional weakness  R53.1       3. Decreased ability to perform activities  R68.89       4. Need for home exercise program  Z78.9       5. Decreased exercise tolerance  R68.89          Medical Diagnosis:  Nontraumatic complete tear of right rotator cuff [M75.121]   Referring Physician: Heriberto Bran MD  PCP: Jose Noonan MD     Plan of care signed (Y/N): routed    Date of Patient follow up with Physician:      Plan of Care Report: YES, Date Range for this report: 25-3/4/25 to    POC update due: (10 visits /OR AUTH LIMITS, whichever is less)  25                                            Medical History:  Medical History    Diagnosis Date Comment Source   HTN (hypertension)         Other Medical History    Diagnosis Date Comment Source   AIDP (acute inflammatory demyelinating polyneuropathy) (HCC) 2020 Admit to [place]    Anesthesia complication  after colonoscopy-was lightheaded    Anxiety      COVID      Depression      Guillain Barré syndrome (HCC)      Heartburn      Wears contact lenses      Wears glasses                                                  Precautions/ Contra-indications:           Latex allergy:  NO  Pacemaker:

## 2025-05-01 ENCOUNTER — HOSPITAL ENCOUNTER (OUTPATIENT)
Dept: PHYSICAL THERAPY | Age: 40
Setting detail: THERAPIES SERIES
Discharge: HOME | End: 2025-05-01
Attending: ORTHOPAEDIC SURGERY
Payer: MEDICARE

## 2025-05-01 PROCEDURE — 97110 THERAPEUTIC EXERCISES: CPT | Performed by: PHYSICAL THERAPIST

## 2025-05-01 PROCEDURE — 97112 NEUROMUSCULAR REEDUCATION: CPT | Performed by: PHYSICAL THERAPIST

## 2025-05-01 PROCEDURE — 97140 MANUAL THERAPY 1/> REGIONS: CPT | Performed by: PHYSICAL THERAPIST

## 2025-05-01 NOTE — FLOWSHEET NOTE
and proprioception, including postural re-education.    Manual Therapy (10571) as indicated to include: Passive Range of Motion, Gr I-IV mobilizations, Soft Tissue Mobilization, Trigger Point Release, and Myofascial Release  Modalities as needed that may include: Vasoneumatic Compression  Patient education on joint protection, postural re-education, activity modification, and progression of HEP    Plan: POC initiated as per evaluationUE arom, aarom, prom, strengthening, scapular strength, myofascial release, joint mobs to gh, sc, ac, scapular thoracic, modalities for pain, hep.  Follow Dr. Bran's protocol  2/4/25  Progress with protocol. Try gentle sl scap mobs  2/11/25  Cont with protocol, give sefl er stretch next rx  2/13/25  Will wait to 6 weeks to give self er.  Work on right cerv rot next rx.  She is lacking right cerv rot  2/18/25  We should be able to increase exs in a week.  She see's md on the 24th.  Will wait until then to increase protocol.    2/20/25  Will initiate week 6 aarom exs after she see's the md next week   2/25/25  Progress with week 6 exs on protocol.   2/27/25  Work on thoracic ext  3/4/25  Continue to increase aarom exs  3/6/25  Begin 8 week  protocol exs, arom supine  3/11/25  Start some supine and sl arom  3/13/25  Start behind the back stretch  3/25/25  Start ube no resistance and gentle isos.   3/27/25  Re eval and send poc next rx   4/1/25  Progress to early strength phase. Give isos for hep  4/8/25  Review hep   4/10/25  Continue to increase rtc and scap exs, pecs stretches   4/15/25  Re eval next rx  4/22/25  Work on exs to help with starting lawnmower  4/29/25  work end ranges  5/1/25  try wall taps      Electronically Signed by Anuj Armendariz, PT  Date: 05/01/2025     Note: Portions of this note have been templated and/or copied from initial evaluation, reassessments and prior notes for documentation efficiency.    Note: If patient does not return for scheduled/recommended follow

## 2025-05-06 ENCOUNTER — HOSPITAL ENCOUNTER (OUTPATIENT)
Dept: PHYSICAL THERAPY | Age: 40
Setting detail: THERAPIES SERIES
Discharge: HOME | End: 2025-05-06
Attending: ORTHOPAEDIC SURGERY
Payer: MEDICARE

## 2025-05-06 PROCEDURE — 97110 THERAPEUTIC EXERCISES: CPT | Performed by: PHYSICAL THERAPIST

## 2025-05-06 PROCEDURE — 97112 NEUROMUSCULAR REEDUCATION: CPT | Performed by: PHYSICAL THERAPIST

## 2025-05-06 PROCEDURE — 97140 MANUAL THERAPY 1/> REGIONS: CPT | Performed by: PHYSICAL THERAPIST

## 2025-05-06 NOTE — FLOWSHEET NOTE
[facility] - Outpatient Rehabilitation and Therapy: [address], [address], [place] [address] office: [phone] fax: [phone]         Physical Therapy Re-Certification Plan of Care/MD UPDATE                      Physical Therapy: TREATMENT/PROGRESS NOTE   Patient: Nkechi Shukla (40 y.o. female)   Examination Date: 2025   :  1985 MRN: [id number]   Visit #:   Insurance Allowable Auth Needed   mn []Yes    [x]No    Insurance: Payor: [facility] MEDICARE / Plan: [employer] DUAL COMPLETE / Product Type: *No Product type* /   Insurance ID: [id number] - (Medicare Managed)  Secondary Insurance (if applicable): MEDICAID [place]   Treatment Diagnosis:     ICD-10-CM    1. Decreased functional activity tolerance  R68.89       2. Functional weakness  R53.1       3. Decreased ability to perform activities  R68.89       4. Need for home exercise program  Z78.9       5. Decreased exercise tolerance  R68.89          Medical Diagnosis:  Nontraumatic complete tear of right rotator cuff [M75.121]   Referring Physician: Heriberto Bran MD  PCP: Jose Noonan MD     Plan of care signed (Y/N): routed    Date of Patient follow up with Physician:      Plan of Care Report: YES, Date Range for this report: 25-3/4/25 to    POC update due: (10 visits /OR AUTH LIMITS, whichever is less)  25                                            Medical History:  Medical History    Diagnosis Date Comment Source   HTN (hypertension)         Other Medical History    Diagnosis Date Comment Source   AIDP (acute inflammatory demyelinating polyneuropathy) (HCC) 2020 Admit to [place]    Anesthesia complication  after colonoscopy-was lightheaded    Anxiety      COVID      Depression      Guillain Barré syndrome (HCC)      Heartburn      Wears contact lenses      Wears glasses                                                  Precautions/ Contra-indications:           Latex allergy:  NO  Pacemaker:

## 2025-05-08 ENCOUNTER — HOSPITAL ENCOUNTER (OUTPATIENT)
Dept: PHYSICAL THERAPY | Age: 40
Setting detail: THERAPIES SERIES
Discharge: HOME | End: 2025-05-08
Attending: ORTHOPAEDIC SURGERY
Payer: MEDICARE

## 2025-05-08 PROCEDURE — 97110 THERAPEUTIC EXERCISES: CPT | Performed by: PHYSICAL THERAPIST

## 2025-05-08 PROCEDURE — 97112 NEUROMUSCULAR REEDUCATION: CPT | Performed by: PHYSICAL THERAPIST

## 2025-05-08 PROCEDURE — 97140 MANUAL THERAPY 1/> REGIONS: CPT | Performed by: PHYSICAL THERAPIST

## 2025-05-08 NOTE — FLOWSHEET NOTE
[facility] - Outpatient Rehabilitation and Therapy: [address], [address], [place] [address] office: [phone] fax: [phone]         Physical Therapy Re-Certification Plan of Care/MD UPDATE                      Physical Therapy: TREATMENT/PROGRESS NOTE   Patient: Nkechi Shukla (40 y.o. female)   Examination Date: 2025   :  1985 MRN: [id number]   Visit #:   Insurance Allowable Auth Needed   mn []Yes    [x]No    Insurance: Payor: [facility] MEDICARE / Plan: [employer] DUAL COMPLETE / Product Type: *No Product type* /   Insurance ID: [id number] - (Medicare Managed)  Secondary Insurance (if applicable): MEDICAID [place]   Treatment Diagnosis:     ICD-10-CM    1. Decreased functional activity tolerance  R68.89       2. Functional weakness  R53.1       3. Decreased ability to perform activities  R68.89       4. Need for home exercise program  Z78.9       5. Decreased exercise tolerance  R68.89          Medical Diagnosis:  Nontraumatic complete tear of right rotator cuff [M75.121]   Referring Physician: Heriberto Bran MD  PCP: Jose Noonan MD     Plan of care signed (Y/N): routed    Date of Patient follow up with Physician:      Plan of Care Report: YES, Date Range for this report: 25-3/4/25 to    POC update due: (10 visits /OR AUTH LIMITS, whichever is less)  25                                            Medical History:  Medical History    Diagnosis Date Comment Source   HTN (hypertension)         Other Medical History    Diagnosis Date Comment Source   AIDP (acute inflammatory demyelinating polyneuropathy) (HCC) 2020 Admit to [place]    Anesthesia complication  after colonoscopy-was lightheaded    Anxiety      COVID      Depression      Guillain Barré syndrome (HCC)      Heartburn      Wears contact lenses      Wears glasses                                                  Precautions/ Contra-indications:           Latex allergy:  NO  Pacemaker:

## 2025-05-09 ENCOUNTER — HOSPITAL ENCOUNTER (OUTPATIENT)
Dept: WOMENS IMAGING | Age: 40
Discharge: HOME | End: 2025-05-09
Attending: INTERNAL MEDICINE
Payer: MEDICARE

## 2025-05-09 DIAGNOSIS — R92.8 ABNORMAL MAMMOGRAM: ICD-10-CM

## 2025-05-09 PROCEDURE — 77065 DX MAMMO INCL CAD UNI: CPT

## 2025-05-13 ENCOUNTER — APPOINTMENT (OUTPATIENT)
Dept: PHYSICAL THERAPY | Age: 40
End: 2025-05-13
Attending: ORTHOPAEDIC SURGERY
Payer: MEDICARE

## 2025-05-15 ENCOUNTER — HOSPITAL ENCOUNTER (OUTPATIENT)
Dept: PHYSICAL THERAPY | Age: 40
Setting detail: THERAPIES SERIES
End: 2025-05-15
Attending: ORTHOPAEDIC SURGERY
Payer: MEDICARE

## 2025-05-21 ENCOUNTER — HOSPITAL ENCOUNTER (OUTPATIENT)
Dept: PHYSICAL THERAPY | Age: 40
Setting detail: THERAPIES SERIES
End: 2025-05-21
Attending: ORTHOPAEDIC SURGERY
Payer: MEDICARE

## 2025-05-23 ENCOUNTER — HOSPITAL ENCOUNTER (OUTPATIENT)
Dept: PHYSICAL THERAPY | Age: 40
Setting detail: THERAPIES SERIES
End: 2025-05-23
Attending: ORTHOPAEDIC SURGERY
Payer: MEDICARE

## 2025-05-29 ENCOUNTER — HOSPITAL ENCOUNTER (OUTPATIENT)
Dept: PHYSICAL THERAPY | Age: 40
Setting detail: THERAPIES SERIES
End: 2025-05-29
Attending: ORTHOPAEDIC SURGERY
Payer: MEDICARE

## 2025-06-02 ENCOUNTER — OFFICE VISIT (OUTPATIENT)
Dept: ORTHOPEDIC SURGERY | Age: 40
End: 2025-06-02
Payer: MEDICARE

## 2025-06-02 VITALS — HEIGHT: 59 IN | BODY MASS INDEX: 35.88 KG/M2 | WEIGHT: 178 LBS

## 2025-06-02 DIAGNOSIS — M75.121 NONTRAUMATIC COMPLETE TEAR OF RIGHT ROTATOR CUFF: Primary | ICD-10-CM

## 2025-06-02 PROCEDURE — 1036F TOBACCO NON-USER: CPT | Performed by: ORTHOPAEDIC SURGERY

## 2025-06-02 PROCEDURE — G8427 DOCREV CUR MEDS BY ELIG CLIN: HCPCS | Performed by: ORTHOPAEDIC SURGERY

## 2025-06-02 PROCEDURE — G8417 CALC BMI ABV UP PARAM F/U: HCPCS | Performed by: ORTHOPAEDIC SURGERY

## 2025-06-02 PROCEDURE — 99213 OFFICE O/P EST LOW 20 MIN: CPT | Performed by: ORTHOPAEDIC SURGERY

## 2025-06-02 NOTE — PROGRESS NOTES
errors within this office note.  If so, please bring any significant errors to my attention for an addendum.  All efforts were made to ensure that this office note is accurate.

## (undated) DEVICE — SP FBRTAK RC FBRTPE BLK/BLU & STTPE BLU
Type: IMPLANTABLE DEVICE | Site: SHOULDER | Status: NON-FUNCTIONAL
Brand: ARTHREX®
Removed: 2025-01-10

## (undated) DEVICE — SHOULDER CANNULA SET WITHOUT FENESTRATIONS, 5.5 MM (I.D.) X 70 MM: Brand: CONMED

## (undated) DEVICE — SUTURE PROL SZ 0 L30IN NONABSORBABLE BLU MO-6 L26MM 1/2 CIR 8418H

## (undated) DEVICE — COVER LT HNDL BLU PLAS

## (undated) DEVICE — SUTURE PROL SZ 3-0 L18IN NONABSORBABLE BLU L30MM PS-1 3/8 8663G

## (undated) DEVICE — BLADE SHV L13CM DIA4MM DISECT AGG COOLCUT

## (undated) DEVICE — SHOULDER ARTHROSCOPY: Brand: MEDLINE INDUSTRIES, INC.

## (undated) DEVICE — SOLUTION IRRIG 3000ML 0.9% SOD CHL USP UROMATIC PLAS CONT

## (undated) DEVICE — KIT DISP W/ SPEAR TRCR TIP OBT AND 2.6MM DRL FOR 2.6

## (undated) DEVICE — 3M™ COBAN™ NL STERILE NON-LATEX SELF-ADHERENT WRAP, 2084S, 4 IN X 5 YD (10 CM X 4,5 M), 18 ROLLS/CASE: Brand: 3M™ COBAN™

## (undated) DEVICE — ELECTRODE PT RET AD L9FT HI MOIST COND ADH HYDRGEL CORDED

## (undated) DEVICE — GOWN SIRUS NONREIN XL W/TWL: Brand: MEDLINE INDUSTRIES, INC.

## (undated) DEVICE — 3M™ IOBAN™ 2 ANTIMICROBIAL INCISE DRAPE 6650EZ: Brand: IOBAN™ 2

## (undated) DEVICE — 3M™ TEGADERM™ TRANSPARENT FILM DRESSING FRAME STYLE, 1626W, 4 IN X 4-3/4 IN (10 CM X 12 CM), 50/CT 4CT/CASE: Brand: 3M™ TEGADERM™

## (undated) DEVICE — IMMOBILIZER SHLDR SWTH UNIV DEV BLK P.A.D. III

## (undated) DEVICE — BUR SHV L13CM DIA4MM 8 FLUT OVL FOR RAP AGG BNE RESECT

## (undated) DEVICE — MERCY HEALTH WEST TURNOVER: Brand: MEDLINE INDUSTRIES, INC.

## (undated) DEVICE — STOCKINETTE,IMPERVIOUS,12X48,STERILE: Brand: MEDLINE

## (undated) DEVICE — PACK,SHOULDER,DRAPE,POUCH: Brand: MEDLINE

## (undated) DEVICE — TRANSFER SET 3": Brand: MEDLINE INDUSTRIES, INC.

## (undated) DEVICE — GLOVE ORANGE PI 7 1/2   MSG9075

## (undated) DEVICE — ABLATOR ENDOSCOPIC ELECTROCAUTERY 90 DEG RF ASPIRATING STERILE DISPOSABLE APOLLORF I90

## (undated) DEVICE — TUBING PMP L16FT MAIN DISP FOR AR-6400 AR-6475 Â€“ ORDER MULTIPLES OF 10 EACH